# Patient Record
Sex: FEMALE | Race: WHITE | Employment: OTHER | URBAN - METROPOLITAN AREA
[De-identification: names, ages, dates, MRNs, and addresses within clinical notes are randomized per-mention and may not be internally consistent; named-entity substitution may affect disease eponyms.]

---

## 2017-09-27 PROBLEM — M20.11 HALLUX VALGUS (ACQUIRED), RIGHT FOOT: Status: ACTIVE | Noted: 2017-09-27

## 2018-08-22 ENCOUNTER — HOSPITAL ENCOUNTER (OUTPATIENT)
Dept: MAMMOGRAPHY | Age: 73
Discharge: HOME | End: 2018-08-24
Payer: COMMERCIAL

## 2018-08-22 ENCOUNTER — HOSPITAL ENCOUNTER (OUTPATIENT)
Dept: PULMONOLOGY | Age: 73
Discharge: HOME | End: 2018-08-22
Payer: COMMERCIAL

## 2018-08-22 DIAGNOSIS — M81.0 AGE-RELATED OSTEOPOROSIS WITHOUT CURRENT PATHOLOGICAL FRACTURE: ICD-10-CM

## 2018-08-22 PROCEDURE — 94729 DIFFUSING CAPACITY: CPT

## 2018-08-22 PROCEDURE — 94060 EVALUATION OF WHEEZING: CPT

## 2018-08-22 PROCEDURE — 94726 PLETHYSMOGRAPHY LUNG VOLUMES: CPT

## 2018-08-22 PROCEDURE — 77080 DXA BONE DENSITY AXIAL: CPT

## 2018-09-03 NOTE — PROCEDURES
[address]                                PULMONARY FUNCTION    PATIENT NAME: Tanvi Vyas                      :        1945  MED REC NO:   [id number]                            ROOM:  ACCOUNT NO:   [de-identified]                           ADMIT DATE: 2018  PROVIDER:     Juani Falcon MD    DATE OF PROCEDURE:  2018    ATTENDING:  Dr. Sonya Gomez. PULMONOLOGIST:  Dr. Aisha Zamudio In conclusion, these PFTs do not reveal evidence of an obstruction. MVV is  normal.  No evidence of restriction. No evidence of hyperinflation. The  airway resistance is stable. The specific airway conduct is mildly  increased with no change postbronchodilator. Diffusion studies are normal.  This test may not explain the clinical presentation.         Jessica Mckeon MD    D: 2018 14:54:56       T: 2018 15:36:37     [email or web address]  Job#: [id number]     Doc#: [id number]    CC:

## 2020-03-09 ENCOUNTER — HOSPITAL ENCOUNTER (OUTPATIENT)
Dept: MAMMOGRAPHY | Age: 75
Discharge: HOME | End: 2020-03-11
Payer: MEDICARE

## 2020-03-09 PROCEDURE — 77067 SCR MAMMO BI INCL CAD: CPT

## 2021-08-04 ENCOUNTER — HOSPITAL ENCOUNTER (OUTPATIENT)
Dept: MAMMOGRAPHY | Age: 76
Discharge: HOME | End: 2021-08-06
Payer: MEDICARE

## 2021-08-04 VITALS — HEIGHT: 61 IN | BODY MASS INDEX: 26.81 KG/M2 | WEIGHT: 142 LBS

## 2021-08-04 DIAGNOSIS — Z12.31 ENCOUNTER FOR SCREENING MAMMOGRAM FOR MALIGNANT NEOPLASM OF BREAST: ICD-10-CM

## 2021-08-04 PROCEDURE — 77067 SCR MAMMO BI INCL CAD: CPT

## 2021-09-07 ENCOUNTER — HOSPITAL ENCOUNTER (OUTPATIENT)
Dept: ULTRASOUND IMAGING | Age: 76
Discharge: HOME | End: 2021-09-07
Payer: MEDICARE

## 2021-09-07 ENCOUNTER — HOSPITAL ENCOUNTER (OUTPATIENT)
Dept: MAMMOGRAPHY | Age: 76
Discharge: HOME | End: 2021-09-09
Payer: MEDICARE

## 2021-09-07 DIAGNOSIS — R92.8 ABNORMAL MAMMOGRAM OF RIGHT BREAST: ICD-10-CM

## 2021-09-07 PROCEDURE — G0279 TOMOSYNTHESIS, MAMMO: HCPCS

## 2021-11-12 ENCOUNTER — TELEPHONE (OUTPATIENT)
Dept: BREAST CENTER | Age: 76
End: 2021-11-12

## 2021-11-12 DIAGNOSIS — R92.1 BREAST CALCIFICATION, RIGHT: Primary | ICD-10-CM

## 2021-11-12 NOTE — TELEPHONE ENCOUNTER
Patient is a new referral, right breast stereotactic biopsy recommended. Patient is agreeable. She states she is not on any blood thinners.

## 2021-12-08 ENCOUNTER — HOSPITAL ENCOUNTER (OUTPATIENT)
Dept: GENERAL RADIOLOGY | Age: 76
Discharge: HOME | End: 2021-12-10
Payer: MEDICARE

## 2021-12-08 DIAGNOSIS — R92.1 BREAST CALCIFICATION, RIGHT: ICD-10-CM

## 2021-12-08 PROCEDURE — 2500000003 HC RX 250 WO HCPCS

## 2021-12-08 PROCEDURE — 77065 DX MAMMO INCL CAD UNI: CPT

## 2021-12-08 PROCEDURE — 88305 TISSUE EXAM BY PATHOLOGIST: CPT

## 2021-12-08 PROCEDURE — 2720000010 MAM STEREO BREAST BX W LOC DEVICE 1ST LESION RIGHT

## 2021-12-08 PROCEDURE — 76098 X-RAY EXAM SURGICAL SPECIMEN: CPT

## 2021-12-08 NOTE — PROGRESS NOTES
Met with patient and her  prior to her breast biopsy. Instructed on stereotactic/tomosynthesis guided  breast biopsy procedure. Denies use of blood thinners or aspirin products within the past 5 days. Instructed that results will be available in approximately 3-5 business days. She is agreeable to discussion of breast biopsy results by phone. Instructed that her physician will also be notified of results. Provided with folder containing my contact information, monthly breast self exam card, and post biopsy discharge instructions. Instructed to call me if she has any questions or concerns about her biopsy. Verbalizes understanding.  Electronically signed by Marcelle Hilliard RN, BSN on 12/8/2021 at 2:09 PM

## 2021-12-14 ENCOUNTER — TELEPHONE (OUTPATIENT)
Dept: GENERAL RADIOLOGY | Age: 76
End: 2021-12-14

## 2021-12-21 ENCOUNTER — TELEPHONE (OUTPATIENT)
Dept: BREAST CENTER | Age: 76
End: 2021-12-21

## 2021-12-21 NOTE — TELEPHONE ENCOUNTER
Called patient to see if she would like a follow up appointment. Her breast biopsy is benign. Patient states she is ok with the results and is opting not to see us.  Referring provider will be notified

## 2022-03-18 DIAGNOSIS — M25.561 RIGHT KNEE PAIN, UNSPECIFIED CHRONICITY: Primary | ICD-10-CM

## 2022-03-22 ENCOUNTER — OFFICE VISIT (OUTPATIENT)
Dept: ORTHOPEDIC SURGERY | Age: 77
End: 2022-03-22
Payer: MEDICARE

## 2022-03-22 VITALS — TEMPERATURE: 98 F | BODY MASS INDEX: 26.81 KG/M2 | WEIGHT: 142 LBS | HEIGHT: 61 IN

## 2022-03-22 DIAGNOSIS — Z96.651 S/P TOTAL KNEE ARTHROPLASTY, RIGHT: Primary | ICD-10-CM

## 2022-03-22 PROCEDURE — 99203 OFFICE O/P NEW LOW 30 MIN: CPT | Performed by: ORTHOPAEDIC SURGERY

## 2022-03-22 NOTE — PROGRESS NOTES
Chief Complaint   Patient presents with    Knee Pain     Right knee pain. Previous replacement by Dr. Hailey Mcgill in 2011. Knee has just started to hurt with no injury known. Subjective:     Patient ID: Martha Arguello is a 76 y.o..  female    Knee Pain  Patient complains of right knee pain. This is evaluated as a personal injury. There was not a history of injury. She had replacement done 11 years ago  The pain began 3 MONTHS ago. The pain is located lateral. She describes  Her symptoms as aching. She has experienced popping, clicking, locking, and giving way in the affected knee. The patient has had pain with kneeling, squating, and climbing stairs. Symptoms improve with rest. The symptoms are worse with activity. The knee has not given out or felt unstable. The patient can bend and straighten the knee fully. The patient is active in none. Treatment to date has been tka, without significant relief. The patient is not working. The patients occupation is RETIRED.      Past Medical History:   Diagnosis Date    Breast cancer ([facility] [address] 2000    right/ DCIS    Cancer ([facility] [address]     right breast  had lumpectomy & radiation     GERD (gastroesophageal reflux disease)     History of therapeutic radiation     Hyperlipidemia     Hypertension     PONV (postoperative nausea and vomiting)     with anesthesia     Past Surgical History:   Procedure Laterality Date    BREAST BIOPSY Left 1991    benign    BREAST BIOPSY Left 1994    benign    BREAST BIOPSY Right 2000    DCIS    BREAST LUMPECTOMY Right 2000    BREAST SURGERY Right 2000    lumpectomy    CHOLECYSTECTOMY      DILATION AND CURETTAGE OF UTERUS      FOOT SURGERY Right 09/27/2017    HYSTERECTOMY  1998    JOINT REPLACEMENT Right     knee    RACHID STEROTACTIC LOC BREAST BIOPSY RIGHT Right 12/8/2021    RACHID STEROTACTIC LOC BREAST BIOPSY RIGHT 12/8/2021 [facility]       Current Outpatient Medications:     Omega-3 Fatty Acids (FISH OIL BURP-LESS PO), Take by mouth daily, Disp: , Rfl:     vitamin D (CHOLECALCIFEROL) 1000 UNIT TABS tablet, Take 1,000 Units by mouth daily, Disp: , Rfl:     simvastatin (ZOCOR) 20 MG tablet, Take 20 mg by mouth nightly, Disp: , Rfl:     ENALAPRIL MALEATE PO, Take 10 mg by mouth 2 times daily , Disp: , Rfl:   No Known Allergies  Social History     Socioeconomic History    Marital status:      Spouse name: Not on file    Number of children: Not on file    Years of education: Not on file    Highest education level: Not on file   Occupational History    Not on file   Tobacco Use    Smoking status: Never Smoker    Smokeless tobacco: Never Used   Substance and Sexual Activity    Alcohol use: No    Drug use: Not on file    Sexual activity: Not on file   Other Topics Concern    Not on file   Social History Narrative    Not on file     Social Determinants of Health     Financial Resource Strain:     Difficulty of Paying Living Expenses: Not on file   Food Insecurity:     Worried About Running Out of Food in the Last Year: Not on file    Olga of Food in the Last Year: Not on file   Transportation Needs:     Lack of Transportation (Medical): Not on file    Lack of Transportation (Non-Medical):  Not on file   Physical Activity:     Days of Exercise per Week: Not on file    Minutes of Exercise per Session: Not on file   Stress:     Feeling of Stress : Not on file   Social Connections:     Frequency of Communication with Friends and Family: Not on file    Frequency of Social Gatherings with Friends and Family: Not on file    Attends Congregational Services: Not on file    Active Member of Clubs or Organizations: Not on file    Attends Club or Organization Meetings: Not on file    Marital Status: Not on file   Intimate Partner Violence:     Fear of Current or Ex-Partner: Not on file    Emotionally Abused: Not on file    Physically Abused: Not on file    Sexually Abused: Not on file   Housing Stability:     Unable to exam of the lower extremities show ; quadriceps, hamstrings, foot dorsi and plantar flexors intact R.  5/5 and L. 5/5. Deep tendon reflexes are 2/4 at the knees and 2/4 at the ankles with strong extensor hallicus longus motor strength bilaterally. Sensory to both feet is intact to all sensory roots. Cardiovascular: The vascular exam is normal and is well perfused to distal extremities. Distal pulses DP/PT: R. 2+; L. 2+. There is cap refill noted less than two seconds in all digits. There is not edema of the bilateral lower extremities. There is not varicosities noted in the distal extremities. Lymph:  Upon palpation,  there is no lymphadenopathy noted in bilateral lower extremities. Musculoskeletal:  Gait: antalgic; examination of the nails and digits reveal no cyanosis or clubbing. Lumbar exam:  On visual inspection, there is not deformity of the spine. full range of motion, no tenderness, palpable spasm or pain on motion. Special tests: Straight Leg Raise negative, Gutierrez test negative. Hip exam:   Upon inspection, there is not deformity noted. Upon palpation there is not tenderness. ROM: is  full and symmetrical.   Strength: Hip Flexors 5/5; Hip Abductors 5/5; Hip Adduction 5/5. Knee exam:  Right knee exam shows;  range of motion of R. Knee is 0 to 120, and L. Knee is 0 to 120. The patient does have  pain on motion, effusion is mild, there is tenderness over the  lateral region, there are not any masses, there is not ligamentous instability, there is not  deformity noted. Knee exam: neither positive for moderate crepitations, some mild tenderness laxity is not noted with  stress.   There is not a popliteal cyst.    R. Knee:  Lachman's negative, Anterior Drawer negative, Posterior Drawer negative  Prateek's negative, Thallasy  negative,   PF grind test negative, Apprehension test negative, Patellar J sign  negative  L. Knee:  Lachman's negative, Anterior Drawer negative, Posterior Drawer negative  Prateek's negative, Thallasy  negative,   PF grind test negative, Apprehension test negative,  Patellar J sign  negative    Xray Exam:  XR IS DOWN TODAY   Radiographic findings reviewed with patient    Assessment:  Encounter Diagnoses   Name Primary?  S/P total knee arthroplasty, right Yes       Plan:  Natural history and expected course discussed. Questions answered. Educational materials distributed. Rest, ice, compression, and elevation (RICE) therapy. Reduction in offending activity. I had a lengthy discussion with the patient regarding their diagnosis. I explained treatment options including surgical vs non surgical treatment. I reviewed in detail the risks and benefits and outlined the procedure in detail with expected outcomes and possible complications. I also discussed non surgical treatment such as injections (CSI ), physical therapy, topical creams and NSAID's. They have elected for CONSERVATIVE management at this time.    Continue nsaids  fu in 4 weeks

## 2022-04-19 ENCOUNTER — OFFICE VISIT (OUTPATIENT)
Dept: ORTHOPEDIC SURGERY | Age: 77
End: 2022-04-19
Payer: MEDICARE

## 2022-04-19 VITALS — WEIGHT: 142 LBS | BODY MASS INDEX: 26.81 KG/M2 | HEIGHT: 61 IN | TEMPERATURE: 98 F

## 2022-04-19 DIAGNOSIS — Z96.651 S/P TOTAL KNEE ARTHROPLASTY, RIGHT: Primary | ICD-10-CM

## 2022-04-19 PROCEDURE — 99213 OFFICE O/P EST LOW 20 MIN: CPT | Performed by: ORTHOPAEDIC SURGERY

## 2022-04-19 NOTE — PROGRESS NOTES
Chief Complaint   Patient presents with    Knee Pain     Right TKA  F/U       Subjective:     Patient ID: Christi Underwood is a 76 y.o..  female    Knee Pain  Patient complains of right knee pain. This is evaluated as a personal injury. There was not a history of injury. She had tka by dr Evelyn Regalado in 2011. She had been doing well until recently.      Past Medical History:   Diagnosis Date    Breast cancer ([facility] [address] 2000    right/ DCIS    Cancer ([facility] [address]     right breast  had lumpectomy & radiation     GERD (gastroesophageal reflux disease)     History of therapeutic radiation     Hyperlipidemia     Hypertension     PONV (postoperative nausea and vomiting)     with anesthesia     Past Surgical History:   Procedure Laterality Date    BREAST BIOPSY Left 1991    benign    BREAST BIOPSY Left 1994    benign    BREAST BIOPSY Right 2000    DCIS    BREAST LUMPECTOMY Right 2000    BREAST SURGERY Right 2000    lumpectomy    CHOLECYSTECTOMY      DILATION AND CURETTAGE OF UTERUS      FOOT SURGERY Right 09/27/2017    HYSTERECTOMY  1998    JOINT REPLACEMENT Right     knee    RACHID STEROTACTIC LOC BREAST BIOPSY RIGHT Right 12/8/2021    RACHID STEROTACTIC LOC BREAST BIOPSY RIGHT 12/8/2021 [facility]       Current Outpatient Medications:     Omega-3 Fatty Acids (FISH OIL BURP-LESS PO), Take by mouth daily, Disp: , Rfl:     vitamin D (CHOLECALCIFEROL) 1000 UNIT TABS tablet, Take 1,000 Units by mouth daily, Disp: , Rfl:     simvastatin (ZOCOR) 20 MG tablet, Take 20 mg by mouth nightly, Disp: , Rfl:     ENALAPRIL MALEATE PO, Take 10 mg by mouth 2 times daily , Disp: , Rfl:   No Known Allergies  Social History     Socioeconomic History    Marital status:      Spouse name: Not on file    Number of children: Not on file    Years of education: Not on file    Highest education level: Not on file   Occupational History    Not on file   Tobacco Use    Smoking status: Never Smoker    Smokeless tobacco: Never Used Substance and Sexual Activity    Alcohol use: No    Drug use: Not on file    Sexual activity: Not on file   Other Topics Concern    Not on file   Social History Narrative    Not on file     Social Determinants of Health     Financial Resource Strain:     Difficulty of Paying Living Expenses: Not on file   Food Insecurity:     Worried About Running Out of Food in the Last Year: Not on file    Olga of Food in the Last Year: Not on file   Transportation Needs:     Lack of Transportation (Medical): Not on file    Lack of Transportation (Non-Medical): Not on file   Physical Activity:     Days of Exercise per Week: Not on file    Minutes of Exercise per Session: Not on file   Stress:     Feeling of Stress : Not on file   Social Connections:     Frequency of Communication with Friends and Family: Not on file    Frequency of Social Gatherings with Friends and Family: Not on file    Attends Congregation Services: Not on file    Active Member of [address] [place] [address] or Organizations: Not on file    Attends Club or Organization Meetings: Not on file    Marital Status: Not on file   Intimate Partner Violence:     Fear of Current or Ex-Partner: Not on file    Emotionally Abused: Not on file    Physically Abused: Not on file    Sexually Abused: Not on file   Housing Stability:     Unable to Pay for Housing in the Last Year: Not on file    Number of [place] in the Last Year: Not on file    Unstable Housing in the Last Year: Not on file     Family History   Problem Relation Age of Onset    Rectal Cancer Mother     Breast Cancer Maternal Aunt         postmenopausal         REVIEW OF SYSTEMS:     General/Constitution:  (-)weight loss, (-)fever, (-)chills, (-)weakness. Skin: (-) rash,(-) psoriasis,(-) eczema, (-)skin cancer.    Musculoskeletal: (-) fractures,  (-) dislocations,(-) collagen vascular disease, (-) fibromyalgia, (-) multiple sclerosis, (-) muscular dystrophy, (-) RSD,(-) joint pain (-)swelling, (-) joint pain,swelling. Neurologic: (-) epilepsy, (-)seizures,(-) brain tumor,(-) TIA, (-)stroke, (-)headaches, (-)Parkinson disease,(-) memory loss, (-) LOC. Cardiovascular: (-) Chest pain, (-) swelling in legs/feet, (-) SOB, (-) cramping in legs/feet with walking. Respiratory: (-) SOB, (-) Coughing, (-) night sweats. GI: (-) nausea, (-) vomiting, (-) diarrhea, (-) blood in stool, (-) gastric ulcer. Psychiatric: (-) Depression, (-) Anxiety, (-) bipolar disease, (-) Alzheimer's Disease  Allergic/Immunologic: (-) allergies latex, (-) allergies metal, (-) skin sensitivity. Hematlogic: (-) anemia, (-) blood transfusion, (-) DVT/PE, (-) Clotting disorders    Subjective:    Constitution:  Temp 98 °F (36.7 °C)   Ht 5' 1\" (1.549 m)   Wt 142 lb (64.4 kg)   BMI 26.83 kg/m²     Psycihatric:  The patient is alert and oriented x 3, appears to be stated age and in no distress. Respiratory:  Respiratory effort is not labored. Patient is not gasping. Palpation of the chest reveals no tactile fremitus. Skin:  Upon inspection: the skin appears warm, dry and intact. There is  a previous scar over the affected area. There is any cellulitis, lymphedema or cutaneous lesions noted in the lower extremities. Upon palpation there is no induration noted. Neurologic:  Gait: antalgic; Motor exam of the lower extremities show ; quadriceps, hamstrings, foot dorsi and plantar flexors intact R.  5/5 and L. 5/5. Deep tendon reflexes are 2/4 at the knees and 2/4 at the ankles with strong extensor hallicus longus motor strength bilaterally. Sensory to both feet is intact to all sensory roots. Cardiovascular: The vascular exam is normal and is well perfused to distal extremities. Distal pulses DP/PT: R. 2+; L. 2+. There is cap refill noted less than two seconds in all digits. There is not edema of the bilateral lower extremities. There is not varicosities noted in the distal extremities.       Lymph:  Upon palpation, there is no lymphadenopathy noted in bilateral lower extremities. Musculoskeletal:  Gait: antalgic; examination of the nails and digits reveal no cyanosis or clubbing. Lumbar exam:  On visual inspection, there is not deformity of the spine. full range of motion, no tenderness, palpable spasm or pain on motion. Special tests: Straight Leg Raise negative, Gutierrez test negative. Hip exam:   Upon inspection, there is not deformity noted. Upon palpation there is not tenderness. ROM: is  full and symmetrical.   Strength: Hip Flexors 5/5; Hip Abductors 5/5; Hip Adduction 5/5. Knee exam:  Right knee exam shows;  range of motion of R. Knee is 0 to 110, and L. Knee is 0 to 120. The patient does have  pain on motion, effusion is none, there is tenderness over the  medial, lateral, anterior region, there are not any masses, there is not ligamentous instability, there is not  deformity noted. Knee exam: neither positive for moderate crepitations, some mild tenderness laxity is not noted with  stress. There is not a popliteal cyst.    R. Knee:  Lachman's negative, Anterior Drawer negative, Posterior Drawer negative  Prateek's negative, Thallasy  negative,   PF grind test negative, Apprehension test negative, Patellar J sign  negative  L. Knee:  Lachman's negative, Anterior Drawer negative, Posterior Drawer negative  Prateek's negative, Thallasy  negative,   PF grind test negative, Apprehension test negative,  Patellar J sign  negative    Xray Exam:  S/p knee arthroplasty with no signs of loosening  Radiographic findings reviewed with patient    Assessment:  Encounter Diagnoses   Name Primary?  S/P total knee arthroplasty, right Yes       Plan:  Natural history and expected course discussed. Questions answered. Educational materials distributed. Rest, ice, compression, and elevation (RICE) therapy. Reduction in offending activity. Patellar compression sleeve.   Discussed no signs of loosening on xr  Pt  hep  voltaren gel  Fu prn

## 2022-04-25 ENCOUNTER — EVALUATION (OUTPATIENT)
Dept: PHYSICAL THERAPY | Age: 77
End: 2022-04-25
Payer: MEDICARE

## 2022-04-25 DIAGNOSIS — Z96.651 S/P TOTAL KNEE ARTHROPLASTY, RIGHT: Primary | ICD-10-CM

## 2022-04-25 PROCEDURE — 97163 PT EVAL HIGH COMPLEX 45 MIN: CPT | Performed by: PHYSICAL THERAPIST

## 2022-04-25 NOTE — PROGRESS NOTES
[facility] OUTPATIENT REHABILITATION  PHYSICAL THERAPY INITIAL EVALUATION         Date:  2022   Patient: Katherine Salazar  : 1945  MRN: [id number]  Referring Provider: DO Aline Ocampo [place]     Medical Diagnosis:      Diagnosis Orders   1. S/P total knee arthroplasty, right         Physician Order: Eval and Treat      SUBJECTIVE:     Onset date: 2022    Onset: Insidious      History / Mechanism of Injury: Reports onset of lateral knee pain that began insidiously about 2 months ago. She denies any injury or change in activity. Prior to this she was fine. She has a history R TKA  [facility]). Imaging notes good placement of prosthesis with no signs of loosening. Interventions for current problem: Voltaren gel     Chief complaint: pain, weakness, difficulty walking, difficulty with stairs, limited ability to complete IADLs (cooking, cleaning, transportation , laundry), limited ability to complete home/outdoor chores/tasks, limited tolerance to weightbearing tasks and weightbearing duration, use of assistive device     Pain: intermittent  Current: 4/10     Best: 0/10     Worst:4/10     Location[de-identified] Knee: lateral    Aggravated by: unable to determine    Relieved by: Tylenol    Imaging results: XR KNEE RIGHT (MIN 4 VIEWS)    Result Date: 2022  EXAMINATION: FOUR XRAY VIEWS OF THE RIGHT KNEE 2022 9:52 am COMPARISON: 2007 HISTORY: ORDERING SYSTEM PROVIDED HISTORY: Right knee pain, unspecified chronicity FINDINGS: Anatomic alignment of right knee arthroplasty with small joint effusion and no abnormal osseous findings. Right TKA with small joint effusion.        Past Medical History  Past Medical History:   Diagnosis Date    Breast cancer ([facility] [address]     right/ DCIS    Cancer ([facility] [address]     right breast  had lumpectomy & radiation     GERD (gastroesophageal reflux disease)     History of therapeutic radiation     Hyperlipidemia     Hypertension     PONV (postoperative nausea and vomiting)     with anesthesia     Past Surgical History:   Procedure Laterality Date    BREAST BIOPSY Left 1991    benign    BREAST BIOPSY Left 1994    benign    BREAST BIOPSY Right 2000    DCIS    BREAST LUMPECTOMY Right 2000    BREAST SURGERY Right 2000    lumpectomy    CHOLECYSTECTOMY      DILATION AND CURETTAGE OF UTERUS      FOOT SURGERY Right 09/27/2017    HYSTERECTOMY  1998    JOINT REPLACEMENT Right     knee    RACHID STEROTACTIC LOC BREAST BIOPSY RIGHT Right 12/8/2021    RACHID STEROTACTIC LOC BREAST BIOPSY RIGHT 12/8/2021 [facility]       Medications:   Current Outpatient Medications   Medication Sig Dispense Refill    diclofenac sodium (VOLTAREN) 1 % GEL Apply 4 g topically 4 times daily 480 g 3    Omega-3 Fatty Acids (FISH OIL BURP-LESS PO) Take by mouth daily      vitamin D (CHOLECALCIFEROL) 1000 UNIT TABS tablet Take 1,000 Units by mouth daily      simvastatin (ZOCOR) 20 MG tablet Take 20 mg by mouth nightly      ENALAPRIL MALEATE PO Take 10 mg by mouth 2 times daily        No current facility-administered medications for this visit. Exercise regimen: [employer] daily. Reports it does not aggravate her knee. Hobbies: makes homemade cards -- no worse afterwards    Patient Goals: pain relief    Precautions / Contraindications: none    OBJECTIVE:     Estimated body mass index is 26.83 kg/m² as calculated from the following:    Height as of 4/19/22: 5' 1\" (1.549 m). Weight as of 4/19/22: 142 lb (64.4 kg).      Observations: well nourished female, normal affect      Inspection: normal orthopedic exam    Edema: none     Gait: antalgic gait, limp R LE    Joint/Motion:    Knee:  Right:   AROM: 110° Flexion,  -8° Extension  PROM: 115° Flexion,  -8° Extension - pain at both ends of motion   Left:   AROM: 130° Flexion,  -8° Extension      Strength:    Knee:   Right: Flexion 3/5,  Extension 3/5  Left: Flexion 5/5, 00251 PT Evaluation: High Complexity   [id number] PT Re-Evaluation   02937 Therapeutic Exercise   [phone] Neuromuscular Re-Education   83282 Therapeutic Activities     Suggested Professional Referral: [x] No  [] Yes:     Patient Education:  [x] Plans / Goals, Risks / Benefits discussed  [x] Home exercise program  Method of Education: [x] Verbal  [x] Demo  [x] Written  Comprehension of Education:  [x] Verbalizes understanding. [x] Demonstrates understanding. [] Needs Review. [] Demonstrates / verbalizes understanding of HEP / Heather Sleeper previously given. Frequency: 1-2 days per week for 4-6 weeks    Patient understands diagnosis/prognosis and consents to treatment, plan and goals: [x] Yes    [] No     Thank you for the opportunity to work with your patient. If you have questions or comments, please contact me at [phone]; fax: [phone]. Electronically signed by: Jose Alejandro Dozier, PT         Please sign Physician's Certification and return to: [address] [place] [address] PHYSICAL THERAPY  [facility] [facility] [address]  Dept: [phone]  Dept Fax: [phone]  Loc: [phone] Certification / Comments     Frequency/Duration 1-2 days per week for 4-6 weeks. Certification period from 4/25/2022  to 7/25/2022. I have reviewed the Plan of Care established for skilled therapy services and certify that the services are required and that they will be provided while the patient is under my care.     Physician's Comments/Revisions:               Physician's Printed Name:                                           [de-identified] Signature:                                                               Date:

## 2022-04-25 NOTE — PROGRESS NOTES
Physical Therapy Daily Treatment Note    Date: 2022  Patient Name: Gricel Mcmahon  : 1945   MRN: [id number]  DOInjury: 2022  DOSx: --  Referring Provider: Robyn Corey DO   [address] [facility] Diagnosis:      Diagnosis Orders   1. S/P total knee arthroplasty, right         Kristofer has joint pain of R knee with loss of motion, weakness, and decreased function. Treatment will focus on ROM, strengthening, and developing joint loading tolerance.       Outcome Measure:   Lower Extremity Functional Scale (LEFS) 33% impairment    Access Code: [email or web address]  URL: [email or web address]  Date: 2022  Prepared by: Tyree Dela Cruz    Exercises  Supine Quad Set - 2 x daily - 3 sets - 10-15 reps  Supine Heel Slide - 2 x daily - 3 sets - 10 reps  Supine Straight Leg Raises - 2 x daily - 3 sets - 10 reps      X = TO BE PERFORMED NEXT VISIT  > = PROGRESS TO THIS    S: See eval  O:    Time  3259-9228       Visit  /? Repeat outcome measure at mid point and end. Pain    Pain 4/10     ROM  -8 ext, 110 flex  Extension has firm end feel and not likely to change. Flexion has soft end feel with pain; likely to improve. Modalities          MO   Manual      Stretching knee flexion   MT   Stretching       Knee flex stretch-seated Monitor. Add as needed. TE   Exercise       Nustep  X  TE   Quad sets 5 sec hold 3 x 10 reps  TE   Heel slides 3 x 10   TE   SLR 3 x 10  TE   LAQ   TE   Marching   TA   Squat    TA   Step-ups - FWD    TA   Step-ups - LAT   TA   Step-ups - BWD    TA   Step up and over reciprocally    TA   [] TG  [] Leg Press 2-leg   TE   [] TG  [] Leg Press 1-leg   TE   CR    TE   Knee Extension Machine   TE   Marching gait   NR   Side stepping   NR               A: Tolerated well. Discussed anatomy, physiology, body mechanics, principles of loading, and progressive loading/activity.   Reviewed home exercise program extensively along with instructions for ice and elevation; written copy provided.     P: Continue with rehab plan  Aurelia Celaya PT    Treatment Charges: Mins Units   Initial Evaluation 40 1   Re-Evaluation     Ther Exercise         TE     Manual Therapy     MT     Ther Activities        TA     Gait Training          GT     Neuro Re-education NR     Modalities     Non-Billable Service Time     Other     Total Time/Units 40 1

## 2022-05-02 ENCOUNTER — TREATMENT (OUTPATIENT)
Dept: PHYSICAL THERAPY | Age: 77
End: 2022-05-02
Payer: MEDICARE

## 2022-05-02 DIAGNOSIS — Z96.651 S/P TOTAL KNEE ARTHROPLASTY, RIGHT: Primary | ICD-10-CM

## 2022-05-02 PROCEDURE — 97110 THERAPEUTIC EXERCISES: CPT

## 2022-05-02 NOTE — PROGRESS NOTES
Physical Therapy Daily Treatment Note    Date: 2022  Patient Name: Keshav Evangelista  : 1945   MRN: [id number]  DOInjury: 2022  DOSx: --  Referring Provider: Manish Willingham DO   [address]  [facility]     Medical Diagnosis:      Diagnosis Orders   1. S/P total knee arthroplasty, right         Kristofer has joint pain of R knee with loss of motion, weakness, and decreased function. Treatment will focus on ROM, strengthening, and developing joint loading tolerance.       Outcome Measure:   Lower Extremity Functional Scale (LEFS) 33% impairment    Access Code: [email or web address]  URL: [email or web address]  Date: 2022  Prepared by: Deanne Varela    Exercises  Supine Quad Set - 2 x daily - 3 sets - 10-15 reps  Supine Heel Slide - 2 x daily - 3 sets - 10 reps  Supine Straight Leg Raises - 2 x daily - 3 sets - 10 reps      X = TO BE PERFORMED NEXT VISIT  > = PROGRESS TO THIS    S: Pt reports 2/10 pain at most. States has been doing exercises at home which have really helped with pain. O:    Time  4783-0130      Visit  2/auth? Repeat outcome measure at mid point and end. Pain    Pain 4/10     ROM  -8 ext, 110 flex  Extension has firm end feel and not likely to change. Flexion has soft end feel with pain; likely to improve. Modalities          MO   Manual      Stretching knee flexion   MT   Stretching       Knee flex stretch-seated Monitor. Add as needed. TE   Exercise       Nustep  L5 x 5 min  TE   Quad sets 5 sec hold 3 x 10 reps W/towel under knee TE   Heel slides 3 x 10   TE   SLR 3 x 10  TE   LAQ 3 x 10  TE   Marching  x 20 ea LE  TA   Alt hip Abd X 20 ea LE     Squat    TA   Step-ups - FWD    TA   Step-ups - LAT   TA   Step-ups - BWD    TA   Step up and over reciprocally    TA   [x] TG  [] Leg Press 2-leg L15 2 x 20  TE   [] TG  [] Leg Press 1-leg   TE   CR    TE   Knee Extension Machine   TE   Marching gait   NR   Side stepping   NR               A: Tolerated well. Minimal soreness during exercises which went away at rest.  P: Continue with rehab plan  Michelle Schmidt PTA    Treatment Charges: Mins Units   Initial Evaluation     Re-Evaluation     Ther Exercise         TE 37 3   Manual Therapy     MT     Ther Activities        TA     Gait Training          GT     Neuro Re-education NR     Modalities     Non-Billable Service Time     Other     Total Time/Units 37 3

## 2022-05-09 ENCOUNTER — TREATMENT (OUTPATIENT)
Dept: PHYSICAL THERAPY | Age: 77
End: 2022-05-09
Payer: MEDICARE

## 2022-05-09 DIAGNOSIS — Z96.651 S/P TOTAL KNEE ARTHROPLASTY, RIGHT: Primary | ICD-10-CM

## 2022-05-09 PROCEDURE — 97110 THERAPEUTIC EXERCISES: CPT

## 2022-05-09 NOTE — PROGRESS NOTES
Physical Therapy Daily Treatment Note    Date: 2022  Patient Name: Abrahan Duncan  : 1945   MRN: [id number]  DOInjury: 2022  DOSx: --  Referring Provider: Tracie Tyler DO   [address]  [facility]     Medical Diagnosis:      Diagnosis Orders   1. S/P total knee arthroplasty, right         Kristofer has joint pain of R knee with loss of motion, weakness, and decreased function. Treatment will focus on ROM, strengthening, and developing joint loading tolerance.       Access Code: [email or web address]  URL: [email or web address]  Date: 2022  Prepared by: Nava Huang    Exercises  Supine Quad Set - 2 x daily - 3 sets - 10-15 reps  Supine Heel Slide - 2 x daily - 3 sets - 10 reps  Supine Straight Leg Raises - 2 x daily - 3 sets - 10 reps  Seated Long Arc Quad - 2 x daily - 3 sets - 10 reps  Standing march with counter support option - 3 x weekly - 1 sets - 20 reps  Alternating Hip Abduction with counter support - 3 x weekly - 1 sets - 20 reps      X = TO BE PERFORMED NEXT VISIT  > = PROGRESS TO THIS    S: Pt reports 2/10 pain at most. States has been doing exercises at home which have really helped with pain. O:    Time  3862-3902      Visit    Ref # [id number]  Approved 8 visits  2022 through 2022 Repeat outcome measure at mid point and end. Pain    Pain 2/10     ROM  -8 ext, 110 flex  Extension has firm end feel and not likely to change. Flexion has soft end feel with pain; likely to improve. Modalities          MO   Manual      Stretching knee flexion   MT   Stretching       Knee flex stretch-seated Monitor. Add as needed.    TE   Exercise       Nustep  L5 x 5 min  TE   Quad sets 5 sec hold 3 x 10 reps W/towel under knee TE   Heel slides 2 x 20   TE   SLR 2 x 15  TE   LAQ 3 x 10  TE   Marching  x 20 ea LE  TA   Alt hip Abd X 20 ea LE     Squat    TA   Step-ups - FWD    TA   Step-ups - LAT   TA   Step-ups - BWD    TA   Step up and over reciprocally    TA   [x] TG [] Leg Press 2-leg L15 2 x 20  TE   [] TG  [] Leg Press 1-leg   TE   CR    TE   Knee Extension Machine   TE   Marching gait   NR   Side stepping   NR               A: Tolerated well. Reviewed HEP and given written copy. P: Pt is happy with her progress and is proficient with her HEP. She wants to continue on her own.   Mason Estrella, PTA    Treatment Charges: Mins Units   Initial Evaluation     Re-Evaluation     Ther Exercise         TE 37 3   Manual Therapy     MT     Ther Activities        TA     Gait Training          GT     Neuro Re-education NR     Modalities     Non-Billable Service Time     Other     Total Time/Units 37 3

## 2023-01-13 DIAGNOSIS — M25.562 LEFT KNEE PAIN, UNSPECIFIED CHRONICITY: Primary | ICD-10-CM

## 2023-01-17 ENCOUNTER — OFFICE VISIT (OUTPATIENT)
Dept: ORTHOPEDIC SURGERY | Age: 78
End: 2023-01-17
Payer: MEDICARE

## 2023-01-17 VITALS — HEIGHT: 61 IN | BODY MASS INDEX: 27.38 KG/M2 | TEMPERATURE: 98 F | WEIGHT: 145 LBS

## 2023-01-17 DIAGNOSIS — M17.12 PRIMARY OSTEOARTHRITIS OF LEFT KNEE: Primary | ICD-10-CM

## 2023-01-17 PROCEDURE — 1123F ACP DISCUSS/DSCN MKR DOCD: CPT | Performed by: ORTHOPAEDIC SURGERY

## 2023-01-17 PROCEDURE — 99214 OFFICE O/P EST MOD 30 MIN: CPT | Performed by: ORTHOPAEDIC SURGERY

## 2023-01-17 NOTE — PROGRESS NOTES
Chief Complaint   Patient presents with    Knee Pain     Left Knee x 1 month, no known injury, no previous left knee surgery. Was given Meloxicam from PCP with some relief. Subjective:     Patient ID: Iqra Alfaro is a 76 y.o..  female    Knee Pain  Patient complains of left knee pain. This is evaluated as a personal injury. There was not a history of injury. The pain began several years ago. The pain is located medial. She describes  Her symptoms as aching. She has experienced popping, clicking, locking, and giving way in the affected knee. The patient has had pain with kneeling, squating, and climbing stairs. Symptoms improve with rest. The symptoms are worse with activity. The knee has not given out or felt unstable. The patient cannot bend and straighten the knee fully. The patient is active in none. Treatment to date has been ice, heat, Tylenol, without significant relief. The patient is not working. The patients occupation is retired.      Past Medical History:   Diagnosis Date    Breast cancer ([facility] [address] 2000    right/ DCIS    Cancer ([facility] [address]     right breast  had lumpectomy & radiation     GERD (gastroesophageal reflux disease)     History of therapeutic radiation     Hyperlipidemia     Hypertension     PONV (postoperative nausea and vomiting)     with anesthesia     Past Surgical History:   Procedure Laterality Date    BREAST BIOPSY Left 1991    benign    BREAST BIOPSY Left 1994    benign    BREAST BIOPSY Right 2000    DCIS    BREAST LUMPECTOMY Right 2000    BREAST SURGERY Right 2000    lumpectomy    CHOLECYSTECTOMY      DILATION AND CURETTAGE OF UTERUS      FOOT SURGERY Right 09/27/2017    HYSTERECTOMY (CERVIX STATUS UNKNOWN)  1998    JOINT REPLACEMENT Right     knee    RACHID STEROTACTIC LOC BREAST BIOPSY RIGHT Right 12/8/2021    RACHID STEROTACTIC LOC BREAST BIOPSY RIGHT 12/8/2021 [facility]       Current Outpatient Medications:     Omega-3 Fatty Acids (FISH OIL BURP-LESS PO), Take by mouth daily, Disp: , Rfl:     vitamin D (CHOLECALCIFEROL) 1000 UNIT TABS tablet, Take 1,000 Units by mouth daily, Disp: , Rfl:     simvastatin (ZOCOR) 20 MG tablet, Take 20 mg by mouth nightly, Disp: , Rfl:     ENALAPRIL MALEATE PO, Take 10 mg by mouth 2 times daily , Disp: , Rfl:     diclofenac sodium (VOLTAREN) 1 % GEL, Apply 4 g topically 4 times daily, Disp: 480 g, Rfl: 3  No Known Allergies  Social History     Socioeconomic History    Marital status:      Spouse name: Not on file    Number of children: Not on file    Years of education: Not on file    Highest education level: Not on file   Occupational History    Not on file   Tobacco Use    Smoking status: Never    Smokeless tobacco: Never   Substance and Sexual Activity    Alcohol use: No    Drug use: Not on file    Sexual activity: Not on file   Other Topics Concern    Not on file   Social History Narrative    Not on file     Social Determinants of Health     Financial Resource Strain: Not on file   Food Insecurity: Not on file   Transportation Needs: Not on file   Physical Activity: Not on file   Stress: Not on file   Social Connections: Not on file   Intimate Partner Violence: Not on file   Housing Stability: Not on file     Family History   Problem Relation Age of Onset    Rectal Cancer Mother     Breast Cancer Maternal Aunt         postmenopausal         REVIEW OF SYSTEMS:     General/Constitution:  (-)weight loss, (-)fever, (-)chills, (-)weakness. Skin: (-) rash,(-) psoriasis,(-) eczema, (-)skin cancer. Musculoskeletal: (-) fractures,  (-) dislocations,(-) collagen vascular disease, (-) fibromyalgia, (-) multiple sclerosis, (-) muscular dystrophy, (-) RSD,(-) joint pain (-)swelling, (-) joint pain,swelling. Neurologic: (-) epilepsy, (-)seizures,(-) brain tumor,(-) TIA, (-)stroke, (-)headaches, (-)Parkinson disease,(-) memory loss, (-) LOC.   Cardiovascular: (-) Chest pain, (-) swelling in legs/feet, (-) SOB, (-) cramping in legs/feet with walking. Respiratory: (-) SOB, (-) Coughing, (-) night sweats. GI: (-) nausea, (-) vomiting, (-) diarrhea, (-) blood in stool, (-) gastric ulcer. Psychiatric: (-) Depression, (-) Anxiety, (-) bipolar disease, (-) Alzheimer's Disease  Allergic/Immunologic: (-) allergies latex, (-) allergies metal, (-) skin sensitivity. Hematlogic: (-) anemia, (-) blood transfusion, (-) DVT/PE, (-) Clotting disorders    Subjective:  _Temp 98 °F (36.7 °C)   Ht 5' 1\" (1.549 m)   Wt 145 lb (65.8 kg)   BMI 27.40 kg/m²  Vital signs are stable. In general, patient is awake, alert and oriented X3, in no apparent distress. Examination of HENT reveals normocephalic, atraumatic. PERRLA/EOMI sclera are white. Conjunctivae are clear. TM's are intact. Pharynx is pink and moist.  Uvula and tongue are midline. Heart: Positive S1 and positive S2 with regular rate and rhythm. Lungs: Clear to auscultation bilaterally without rales, rhonchi or wheezes. Abdomen: soft, nontender. Positive bowel sounds. No organomegaly. No guarding or rigidity. Constitution:  Temp 98 °F (36.7 °C)   Ht 5' 1\" (1.549 m)   Wt 145 lb (65.8 kg)   BMI 27.40 kg/m²     Psycihatric:  The patient is alert and oriented x 3, appears to be stated age and in no distress. Respiratory:  Respiratory effort is not labored. Patient is not gasping. Palpation of the chest reveals no tactile fremitus. Skin:  Upon inspection: the skin appears warm, dry and intact. There is  a previous scar over the affected area. There is any cellulitis, lymphedema or cutaneous lesions noted in the lower extremities. Upon palpation there is no induration noted. Neurologic:  Gait: antalgic; Motor exam of the lower extremities show ; quadriceps, hamstrings, foot dorsi and plantar flexors intact R.  5/5 and L. 5/5. Deep tendon reflexes are 2/4 at the knees and 2/4 at the ankles with strong extensor hallicus longus motor strength bilaterally.  Sensory to both feet is intact to all sensory roots. Cardiovascular: The vascular exam is normal and is well perfused to distal extremities. Distal pulses DP/PT: R. 2+; L. 2+. There is cap refill noted less than two seconds in all digits. There is not edema of the bilateral lower extremities. There is not varicosities noted in the distal extremities. Lymph:  Upon palpation,  there is no lymphadenopathy noted in bilateral lower extremities. Musculoskeletal:  Gait: antalgic; examination of the nails and digits reveal no cyanosis or clubbing. Lumbar exam:  On visual inspection, there is not deformity of the spine. full range of motion, no tenderness, palpable spasm or pain on motion. Special tests: Straight Leg Raise negative, Gutierrez test negative. Hip exam:   Upon inspection, there is not deformity noted. Upon palpation there is not tenderness. ROM: is  full and symmetrical.   Strength: Hip Flexors 5/5; Hip Abductors 5/5; Hip Adduction 5/5. Knee exam:  Left knee exam shows;  range of motion of R. Knee is 0 to 120, and L. Knee is 5 to 120. The patient does have  pain on motion, effusion is mild, there is tenderness over the  medial region, there are not any masses, there is not ligamentous instability, there is  deformity noted. Knee exam: left positive for moderate crepitations, some mild tenderness laxity is not noted with  stress. There is not a popliteal cyst.    R. Knee:  Lachman's negative, Anterior Drawer negative, Posterior Drawer negative  Prateek's negative, Thallasy  negative,   PF grind test negative, Apprehension test negative, Patellar J sign  negative  L. Knee:  Lachman's negative, Anterior Drawer negative, Posterior Drawer negative  Prateek's positive, Thallasy  positive,   PF grind test positive, Apprehension test negative,  Patellar J sign  negative    Xray Exam:  Severe medial joint narrowing  Radiographic findings reviewed with patient    Assessment:  Encounter Diagnoses   Name Primary? Primary osteoarthritis of left knee Yes       Plan:  Natural history and expected course discussed. Questions answered. Educational materials distributed. Rest, ice, compression, and elevation (RICE) therapy. Reduction in offending activity. I had a lengthy discussion with the patient regarding their diagnosis. I explained treatment options including surgical vs non surgical treatment. I reviewed in detail the risks and benefits and outlined the procedure in detail with expected outcomes and possible complications. I also discussed non surgical treatment such as injections (CSI and visco supplementation), physical therapy, topical creams and NSAID's. They have elected for surgical management at this time. We discussed various treatment options both surgical and non-surgical.   The patient is unable to ambulate more than 100 feet and is unable to perform the average daily activities including:  Light housework, ADLs, donning clothes, toileting and exercise. Patient has failed previous conservative measures including cortisone injections, NSAIDs, PT, HEP and pain medication and is currently a fall risk to the disability and decreased functioning. The patient wishes to have the total knee arthroplasty. The risks and benefits of a total knee replacement were discussed with the patient. The risks include but are not limited to: infection, injury to blood vessels and nerves, non relief of symptoms, arthrofibrosis of knee, aseptic loosening of prosthesis, intraoperative fracture, blood loss, PE/DVT, MI, dislocation of hip and knee, need for further operative intervention and death. The patient is aware of the risks and wished to proceed with a Left total knee replacement 2/20/23. We will obtain medical clearence from the PCP. At least 30 minutes was spent discussing the diagnosis and treatment options with the patient with at least 50% of the time was spent with decision making and counseling the patient.   The patient was counseled at length about the risks of gia Covid-19 during their perioperative period and any recovery window from their procedure. The patient was made aware that gia Covid-19  may worsen their prognosis for recovering from their procedure  and lend to a higher morbidity and/or mortality risk. All material risks, benefits, and reasonable alternatives including postponing the procedure were discussed. The patient does wish to proceed with the procedure at this time.

## 2023-01-27 DIAGNOSIS — M17.12 PRIMARY OSTEOARTHRITIS OF LEFT KNEE: Primary | ICD-10-CM

## 2023-02-13 ENCOUNTER — TELEPHONE (OUTPATIENT)
Dept: ORTHOPEDIC SURGERY | Age: 78
End: 2023-02-13

## 2023-02-13 NOTE — TELEPHONE ENCOUNTER
Prior Authorization Form:      DEMOGRAPHICS:                     Patient Name:  Michael Morton  Patient :  1945            Insurance:  Payor: Bluford Lesches / Plan: Naomi Briseno ESSENTIAL/PLUS / Product Type: *No Product type* /   Insurance ID Number:    Payer/Plan Subscr  Sex Relation Sub. Ins. ID Effective Group Num   1.  [address] [facility] 1945 Female Self [id number]                                    [id number]         DIAGNOSIS & PROCEDURE:                       Procedure/Operation: LEFT KNEE TOTAL KNEE ARTHROPLASTY           CPT Code: 62382    Diagnosis:  LEFT KNEE DJD    ICD10 Code: M17.12    [facility]:  [facility]    Surgeon:  KALEIGH CEDILLO    SCHEDULING INFORMATION:                          Date: 23    Time: TO FOLLOW              Anesthesia:  Spinal                                                       Status:  Outpatient        Special Comments:  JUSTIN       Electronically signed by Gera Zuniga ATC on 2023 at 11:59 AM

## 2023-02-16 ENCOUNTER — ANESTHESIA EVENT (OUTPATIENT)
Dept: OPERATING ROOM | Age: 78
End: 2023-02-16
Payer: MEDICARE

## 2023-02-16 ENCOUNTER — HOSPITAL ENCOUNTER (OUTPATIENT)
Dept: GENERAL RADIOLOGY | Age: 78
Discharge: HOME | End: 2023-02-18
Payer: MEDICARE

## 2023-02-16 ENCOUNTER — HOSPITAL ENCOUNTER (OUTPATIENT)
Dept: PREADMISSION TESTING | Age: 78
Discharge: HOME | End: 2023-02-16
Payer: MEDICARE

## 2023-02-16 VITALS
HEART RATE: 86 BPM | BODY MASS INDEX: 26.24 KG/M2 | DIASTOLIC BLOOD PRESSURE: 82 MMHG | OXYGEN SATURATION: 99 % | HEIGHT: 61 IN | SYSTOLIC BLOOD PRESSURE: 133 MMHG | RESPIRATION RATE: 16 BRPM | TEMPERATURE: 97.8 F | WEIGHT: 139 LBS

## 2023-02-16 DIAGNOSIS — M17.12 PRIMARY OSTEOARTHRITIS OF LEFT KNEE: ICD-10-CM

## 2023-02-16 DIAGNOSIS — Z01.818 PRE-OP TESTING: Primary | ICD-10-CM

## 2023-02-16 LAB
ALBUMIN SERPL-MCNC: 4.6 G/DL (ref 3.5–5.2)
ALP BLD-CCNC: 82 U/L (ref 35–104)
ALT SERPL-CCNC: 13 U/L (ref 0–32)
ANION GAP SERPL CALCULATED.3IONS-SCNC: 7 MMOL/L (ref 7–16)
APTT: 30 SEC (ref 24.5–35.1)
AST SERPL-CCNC: 19 U/L (ref 0–31)
BACTERIA: ABNORMAL /HPF
BASOPHILS ABSOLUTE: 0.04 E9/L (ref 0–0.2)
BASOPHILS RELATIVE PERCENT: 0.5 % (ref 0–2)
BILIRUB SERPL-MCNC: 0.7 MG/DL (ref 0–1.2)
BILIRUBIN URINE: NEGATIVE
BLOOD, URINE: ABNORMAL
BUN BLDV-MCNC: 19 MG/DL (ref 6–23)
CALCIUM SERPL-MCNC: 9.8 MG/DL (ref 8.6–10.2)
CHLORIDE BLD-SCNC: 103 MMOL/L (ref 98–107)
CLARITY: CLEAR
CO2: 29 MMOL/L (ref 22–29)
COLOR: YELLOW
CREAT SERPL-MCNC: 0.7 MG/DL (ref 0.5–1)
EOSINOPHILS ABSOLUTE: 0.11 E9/L (ref 0.05–0.5)
EOSINOPHILS RELATIVE PERCENT: 1.3 % (ref 0–6)
GFR SERPL CREATININE-BSD FRML MDRD: >60 ML/MIN/1.73
GLUCOSE BLD-MCNC: 92 MG/DL (ref 74–99)
GLUCOSE URINE: 100 MG/DL
HBA1C MFR BLD: 5.4 % (ref 4–5.6)
HCT VFR BLD CALC: 44 % (ref 34–48)
HEMOGLOBIN: 15.3 G/DL (ref 11.5–15.5)
IMMATURE GRANULOCYTES #: 0.02 E9/L
IMMATURE GRANULOCYTES %: 0.2 % (ref 0–5)
INR BLD: 1
KETONES, URINE: NEGATIVE MG/DL
LEUKOCYTE ESTERASE, URINE: NEGATIVE
LYMPHOCYTES ABSOLUTE: 3.68 E9/L (ref 1.5–4)
LYMPHOCYTES RELATIVE PERCENT: 43.3 % (ref 20–42)
MCH RBC QN AUTO: 32.6 PG (ref 26–35)
MCHC RBC AUTO-ENTMCNC: 34.8 % (ref 32–34.5)
MCV RBC AUTO: 93.6 FL (ref 80–99.9)
MONOCYTES ABSOLUTE: 0.63 E9/L (ref 0.1–0.95)
MONOCYTES RELATIVE PERCENT: 7.4 % (ref 2–12)
NEUTROPHILS ABSOLUTE: 4.01 E9/L (ref 1.8–7.3)
NEUTROPHILS RELATIVE PERCENT: 47.3 % (ref 43–80)
NITRITE, URINE: NEGATIVE
PDW BLD-RTO: 12.3 FL (ref 11.5–15)
PH UA: 6 (ref 5–9)
PLATELET # BLD: 175 E9/L (ref 130–450)
PMV BLD AUTO: 11.2 FL (ref 7–12)
POTASSIUM REFLEX MAGNESIUM: 3.9 MMOL/L (ref 3.5–5)
PREALBUMIN: 21 MG/DL (ref 20–40)
PROTEIN UA: NEGATIVE MG/DL
PROTHROMBIN TIME: 11.4 SEC (ref 9.3–12.4)
RBC # BLD: 4.7 E12/L (ref 3.5–5.5)
RBC UA: ABNORMAL /HPF (ref 0–2)
SODIUM BLD-SCNC: 139 MMOL/L (ref 132–146)
SPECIFIC GRAVITY UA: 1.02 (ref 1–1.03)
TOTAL PROTEIN: 6.8 G/DL (ref 6.4–8.3)
UROBILINOGEN, URINE: 0.2 E.U./DL
WBC # BLD: 8.5 E9/L (ref 4.5–11.5)
WBC UA: ABNORMAL /HPF (ref 0–5)

## 2023-02-16 PROCEDURE — 83036 HEMOGLOBIN GLYCOSYLATED A1C: CPT

## 2023-02-16 PROCEDURE — 80053 COMPREHEN METABOLIC PANEL: CPT

## 2023-02-16 PROCEDURE — 85610 PROTHROMBIN TIME: CPT

## 2023-02-16 PROCEDURE — 85730 THROMBOPLASTIN TIME PARTIAL: CPT

## 2023-02-16 PROCEDURE — 87081 CULTURE SCREEN ONLY: CPT

## 2023-02-16 PROCEDURE — 71046 X-RAY EXAM CHEST 2 VIEWS: CPT

## 2023-02-16 PROCEDURE — 81001 URINALYSIS AUTO W/SCOPE: CPT

## 2023-02-16 PROCEDURE — 84134 ASSAY OF PREALBUMIN: CPT

## 2023-02-16 PROCEDURE — 36415 COLL VENOUS BLD VENIPUNCTURE: CPT

## 2023-02-16 PROCEDURE — 85025 COMPLETE CBC W/AUTO DIFF WBC: CPT

## 2023-02-16 PROCEDURE — 87088 URINE BACTERIA CULTURE: CPT

## 2023-02-16 RX ORDER — GINGER ROOT/GINGER ROOT EXT 262.5 MG
1 CAPSULE ORAL DAILY
COMMUNITY

## 2023-02-16 RX ORDER — DORZOLAMIDE HCL 20 MG/ML
2 SOLUTION/ DROPS OPHTHALMIC 3 TIMES DAILY
COMMUNITY

## 2023-02-16 ASSESSMENT — PAIN DESCRIPTION - ORIENTATION: ORIENTATION: LEFT

## 2023-02-16 ASSESSMENT — PAIN DESCRIPTION - LOCATION: LOCATION: KNEE

## 2023-02-16 ASSESSMENT — PAIN DESCRIPTION - DESCRIPTORS: DESCRIPTORS: SORE;ACHING

## 2023-02-16 ASSESSMENT — PAIN SCALES - GENERAL: PAINLEVEL_OUTOF10: 7

## 2023-02-16 ASSESSMENT — PAIN DESCRIPTION - PAIN TYPE: TYPE: CHRONIC PAIN

## 2023-02-16 ASSESSMENT — PAIN DESCRIPTION - ONSET: ONSET: ON-GOING

## 2023-02-16 NOTE — PROGRESS NOTES
[address] [facility]                                                                                                                    PRE OP INSTRUCTIONS FOR  Dilma Fenton        Date: 2/16/2023    Date of surgery: 2/20/23   Arrival Time: Hospital will call you between 5pm and 7pm Friday with your final arrival time for surgery    Do not eat or drink anything after midnight prior to surgery. This includes no water, chewing gum, mints or ice chips. Take the following medications with a small sip of water on the morning of Surgery: Eye drops     Diabetics may take evening dose of insulin but none after midnight. If you feel symptomatic or low blood sugar morning of surgery drink 1-2 ounces of apple juice only. Aspirin, Ibuprofen, Advil, Naproxen, Vitamin E and other Anti-inflammatory products should be stopped  before surgery  as directed by your physician. Take Tylenol only unless instructed otherwise by your surgeon. Check with your Doctor regarding stopping Plavix, Coumadin, Lovenox, Eliquis, Effient, or other blood thinners. Do not smoke,use illicit drugs and do not drink any alcoholic beverages 24 hours prior to surgery. You may brush your teeth the morning of surgery. DO NOT SWALLOW WATER    You MUST make arrangements for a responsible adult to take you home after your surgery. You will not be allowed to leave alone or drive yourself home. It is strongly suggested someone stay with you the first 24 hrs. Your surgery will be cancelled if you do not have a ride home. PEDIATRIC PATIENTS ONLY:  A parent/legal guardian must accompany a child scheduled for surgery and plan to stay at the hospital until the child is discharged. Please do not bring other children with you.     Please wear simple, loose fitting clothing to the hospital.  Mi Morton not bring valuables (money, credit cards, checkbooks, etc.) Do not wear any makeup (including no eye makeup) or nail polish on your fingers or toes.    DO NOT wear any jewelry or piercings on day of surgery. All body piercing jewelry must be removed. Shower the night before surgery with __x_Antibacterial soap /COSME WIPES___x_____    TOTAL JOINT REPLACEMENT/HYSTERECTOMY PATIENTS ONLY---Remember to bring Blood Bank bracelet to the hospital on the day of surgery. If you have a Living Will and Durable Power of  for Healthcare, please bring in a copy. If appropriate bring crutches, inspirex, WALKER, CANE etc... Notify your Surgeon if you develop any illness between now and surgery time, cough, cold, fever, sore throat, nausea, vomiting, etc.  Please notify your surgeon if you experience dizziness, shortness of breath or blurred vision between now & the time of your surgery. If you have _x__dentures, they will be removed before going to the OR; we will provide you a container. If you wear ___contact lenses or _x__glasses, they will be removed; please bring a case for them. To provide excellent care visitors will be limited to 2 in the room at any given time. Please bring picture ID and insurance card. Sleep apnea patients need to bring CPAP SETTINGS to hospital on day of surgery. During flu season no children under the age of 15 are permitted in the hospital for the safety of all patients. Other                   Please call AMBULATORY CARE if you have any further questions.    [address] [facility]     [address]

## 2023-02-16 NOTE — ANESTHESIA PRE PROCEDURE
Department of Anesthesiology  Preprocedure Note       Name:  Vida Royal   Age:  [de-identified] y.o.  :  1945                                          MRN:  [id number]         Date:  2023      Surgeon: Connie Ulloa): Jeanne Johnson DO    Procedure: Procedure(s):  LEFT KNEE TOTAL KNEE ARTHROPLASTY-23 [facility] AND NEPHEW    Medications prior to admission:   Prior to Admission medications    Medication Sig Start Date End Date Taking?  Authorizing Provider   brimonidine (ALPHAGAN P) 0.1 % SOLN Place 1 drop into both eyes 2 times daily    Historical Provider, MD   dorzolamide (TRUSOPT) 2 % ophthalmic solution Place 2 drops into both eyes 3 times daily    Historical Provider, MD   LATANOPROST OP Place 1 drop into both eyes nightly    Historical Provider, MD   timolol (BETIMOL) 0.25 % ophthalmic solution Place 1 drop into the right eye 2 times daily    Historical Provider, MD   calcium carb-cholecalciferol (CALCIUM 600+D) 600-20 MG-MCG TABS Take 1 tablet by mouth daily    Historical Provider, MD   CPAP Machine MISC by Does not apply route nightly    Historical Provider, MD   Omega-3 Fatty Acids (FISH OIL BURP-LESS PO) Take by mouth daily    Historical Provider, MD   simvastatin (ZOCOR) 20 MG tablet Take 20 mg by mouth every evening    Historical Provider, MD   ENALAPRIL MALEATE PO Take 10 mg by mouth 2 times daily     Historical Provider, MD       Current medications:    Current Facility-Administered Medications   Medication Dose Route Frequency Provider Last Rate Last Admin    ortho mix injection   Injection On Call TANIA Mac CNP        sodium chloride flush 0.9 % injection 5-40 mL  5-40 mL IntraVENous 2 times per day TANIA Mac CNP        sodium chloride flush 0.9 % injection 5-40 mL  5-40 mL IntraVENous PRN TANIA Mac - CNP        0.9 % sodium chloride infusion   IntraVENous PRN TANIA Mac CNP        ceFAZolin (ANCEF) 2,000 mg in sterile water 20 mL IV syringe  2,000 mg IntraVENous On Call to TANIA Young CNP        scopolamine (TRANSDERM-SCOP) transdermal patch 1 patch  1 patch TransDERmal Q72H [place] Corporal, DO   1 patch at 02/20/23 9987       Allergies:  No Known Allergies    Problem List:    Patient Active Problem List   Diagnosis Code    Hallux valgus (acquired), right foot M20.11    S/P total knee arthroplasty, right Z96.651    Primary osteoarthritis of left knee M17.12       Past Medical History:        Diagnosis Date    Breast cancer ([facility] [address] 2000    right/ DCIS    Cancer ([facility] [address]     right breast  had lumpectomy & radiation     GERD (gastroesophageal reflux disease)     History of therapeutic radiation     Hyperlipidemia     Hypertension     PONV (postoperative nausea and vomiting)     with anesthesia    Sleep apnea        Past Surgical History:        Procedure Laterality Date    BREAST BIOPSY Left 1991    benign    BREAST BIOPSY Left 1994    benign    BREAST BIOPSY Right 2000    DCIS    BREAST LUMPECTOMY Right 2000    BREAST SURGERY Right 2000    lumpectomy    CHOLECYSTECTOMY      DILATION AND CURETTAGE OF UTERUS      FOOT SURGERY Right 09/27/2017    HYSTERECTOMY (CERVIX STATUS UNKNOWN)  1998    JOINT REPLACEMENT Right     knee    RACHID STEROTACTIC LOC BREAST BIOPSY RIGHT Right 12/8/2021    RACHID STEROTACTIC LOC BREAST BIOPSY RIGHT 12/8/2021 [facility]       Social History:    Social History     Tobacco Use    Smoking status: Never    Smokeless tobacco: Never   Substance Use Topics    Alcohol use:  No                                Counseling given: Not Answered      Vital Signs (Current):   Vitals:    02/20/23 0509   BP: (!) 115/59   Pulse: 77   Resp: 16   Temp: 98.1 °F (36.7 °C)   TempSrc: Infrared   SpO2: 97%                                              BP Readings from Last 3 Encounters:   02/20/23 (!) 115/59   02/16/23 133/82   09/27/17 (!) 142/47       NPO Status: Time of last liquid consumption: 0430 (sip water)                        Time of last solid consumption: 2000                        Date of last liquid consumption: 02/19/23                        Date of last solid food consumption: 02/19/23    BMI:   Wt Readings from Last 3 Encounters:   02/16/23 139 lb (63 kg)   01/17/23 145 lb (65.8 kg)   04/19/22 142 lb (64.4 kg)     There is no height or weight on file to calculate BMI.    CBC:   Lab Results   Component Value Date/Time    WBC 8.5 02/16/2023 10:10 AM    RBC 4.70 02/16/2023 10:10 AM    HGB 15.3 02/16/2023 10:10 AM    HCT 44.0 02/16/2023 10:10 AM    MCV 93.6 02/16/2023 10:10 AM    RDW 12.3 02/16/2023 10:10 AM     02/16/2023 10:10 AM       CMP:   Lab Results   Component Value Date/Time     02/16/2023 10:10 AM    K 3.9 02/16/2023 10:10 AM     02/16/2023 10:10 AM    CO2 29 02/16/2023 10:10 AM    BUN 19 02/16/2023 10:10 AM    CREATININE 0.7 02/16/2023 10:10 AM    LABGLOM >60 02/16/2023 10:10 AM    GLUCOSE 92 02/16/2023 10:10 AM    GLUCOSE 95 10/11/2010 11:20 AM    PROT 6.8 02/16/2023 10:10 AM    CALCIUM 9.8 02/16/2023 10:10 AM    BILITOT 0.7 02/16/2023 10:10 AM    ALKPHOS 82 02/16/2023 10:10 AM    AST 19 02/16/2023 10:10 AM    ALT 13 02/16/2023 10:10 AM       POC Tests: No results for input(s): POCGLU, POCNA, POCK, POCCL, POCBUN, POCHEMO, POCHCT in the last 72 hours.    Coags:   Lab Results   Component Value Date/Time    PROTIME 11.4 02/16/2023 10:10 AM    INR 1.0 02/16/2023 10:10 AM    APTT 30.0 02/16/2023 10:10 AM       HCG (If Applicable): No results found for: PREGTESTUR, PREGSERUM, HCG, HCGQUANT     ABGs: No results found for: PHART, PO2ART, RAB1IAY, RIL7NQG, BEART, T1MTRPOZ     Type & Screen (If Applicable):  No results found for: LABABO, LABRH    Drug/Infectious Status (If Applicable):  No results found for: HIV, HEPCAB    COVID-19 Screening (If Applicable): No results found for: COVID19        Anesthesia Evaluation  Patient summary reviewed   history of anesthetic complications:  PONV.  Airway: Mallampati: III  TM distance: >3 FB   Neck ROM: full  Mouth opening: > = 3 FB   Dental:      Comment: Dentition intact, upper left partial, missing multiple molars, denies any loose teeth. Pulmonary: breath sounds clear to auscultation  (+) sleep apnea: on CPAP,                             Cardiovascular:    (+) hypertension:, hyperlipidemia        Rhythm: regular  Rate: normal                    Neuro/Psych:   Negative Neuro/Psych ROS              GI/Hepatic/Renal:   (+) GERD:,           Endo/Other:    (+) : arthritis: OA., malignancy/cancer ( right breast  had lumpectomy & radiation ). Abdominal:             Vascular: negative vascular ROS. Other Findings:           Anesthesia Plan      spinal     ASA 3     (Left Adductor canal nerve block. Back -up general.)  Induction: intravenous. MIPS: Postoperative opioids intended and Prophylactic antiemetics administered. Anesthetic plan and risks discussed with patient. Plan discussed with CRNA. Post-op pain plan if not by surgeon: single peripheral nerve block            Maninder Ibarra MD   2/16/2023    DOS STAFF ADDENDUM:    Pt seen and examined, chart reviewed (including anesthesia, drug and allergy history). Anesthetic plan, risks, benefits, alternatives, and personnel involved discussed with patient. Patient verbalized an understanding and agrees to proceed. Plan discussed with care team members and agreed upon. Aguilar Liu MD  Staff Anesthesiologist  6:19 AM      Chart Review:  Chart reviewed per routine on February 20, 2023 at 6:47 AM by TANIA Montez CRNA. Above represents information available via shared medical record including previous anesthesia history, drug and allergy history.     TANIA Montez CRNA

## 2023-02-16 NOTE — PROGRESS NOTES
Patient and  attended preoperative Total Joint Camp on 2/16/2023. Patient is scheduled to have an elective knee replacement. Patient was educated regarding Disease Process, Medications, Smoking Cessation, Oxygenation, Incentive Spirometry and Deep Breath and Cough, signs and symptoms of postoperative joint infection that include: Fever, Chills, Pain Control, Drainage and Redness, post-op follow up with orthopaedic surgeon, dressing removal, staple removal, ambulatory devices which include a wheeled walker and cane, bed mobility, correct anatomical alignment, active range of motion, proper transferring technique, incision care, infection prevention measures, non-pharmacologic comfort measures, notification of inadequate pain control measures, pain scale for assessing level of pain, pharmacologic pain management, relaxation techniques.

## 2023-02-17 LAB — MRSA CULTURE ONLY: NORMAL

## 2023-02-17 NOTE — CARE COORDINATION
02/17/23 1508   Social/Functional History   Lives With Spouse   Type of [address] One level   Home Access Stairs to enter without rails   Entrance Stairs - Number of Steps 2 steps to enter   Bathroom Shower/Tub Walk-in shower   Bathroom Toilet Handicap height   [facility] Help From Family   Condition of Participation: Discharge Planning   The Plan for Transition of Care is related to the following treatment goals: HHC/DME   The Patient and/or Patient Representative was provided with a Choice of Provider? Patient   The Patient and/Or Patient Representative agree with the Discharge Plan? Yes   Freedom of Choice list was provided with basic dialogue that supports the patient's individualized plan of care/goals, treatment preferences, and shares the quality data associated with the providers? Yes   2/17/2023: SS Note/Discharge planning:  Met with pt in PAT, pt having surgery for a total knee arthroplasty on 2/20 , explained sw role for transition of care and reviewed rehab options for [facility], pt plans to return home day of surgery from [facility], pt's  will help her and provide her transport home, pt prefers [facility], referrals made to Gerald Anton at [facility] for home PT and to Deepa at [facility] for a w/w for delivery to [facility] between 10am-2pm, pt prefers to get a shower seat on her own if needed, will follow for home therapy and dme orders. sw to follow post-op as needed.  Electronically signed by GEETHA Kirkpatrick on 2/17/2023 at 3:07 PM

## 2023-02-17 NOTE — ACP (ADVANCE CARE PLANNING)
Advance Care Planning   Healthcare Decision Maker:    Primary Decision Maker: Tanya Ala - [phone]    Secondary Decision Maker: Mckenzie Martin - [phone]    Click here to complete Healthcare Decision Makers including selection of the Healthcare Decision Maker Relationship (ie \"Primary\"). Today we documented Decision Maker(s) consistent with Legal Next of Kin hierarchy.

## 2023-02-18 LAB — URINE CULTURE, ROUTINE: NORMAL

## 2023-02-20 ENCOUNTER — ANESTHESIA (OUTPATIENT)
Dept: OPERATING ROOM | Age: 78
End: 2023-02-20
Payer: MEDICARE

## 2023-02-20 ENCOUNTER — HOSPITAL ENCOUNTER (OUTPATIENT)
Age: 78
Setting detail: OUTPATIENT SURGERY
Discharge: HOME | End: 2023-02-20
Attending: ORTHOPAEDIC SURGERY | Admitting: ORTHOPAEDIC SURGERY
Payer: MEDICARE

## 2023-02-20 ENCOUNTER — APPOINTMENT (OUTPATIENT)
Dept: GENERAL RADIOLOGY | Age: 78
End: 2023-02-20
Attending: ORTHOPAEDIC SURGERY
Payer: MEDICARE

## 2023-02-20 VITALS
OXYGEN SATURATION: 97 % | HEART RATE: 85 BPM | DIASTOLIC BLOOD PRESSURE: 68 MMHG | TEMPERATURE: 96.9 F | SYSTOLIC BLOOD PRESSURE: 140 MMHG | RESPIRATION RATE: 20 BRPM

## 2023-02-20 DIAGNOSIS — M17.12 PRIMARY OSTEOARTHRITIS OF LEFT KNEE: Primary | ICD-10-CM

## 2023-02-20 DIAGNOSIS — M17.12 OSTEOARTHRITIS OF LEFT KNEE: ICD-10-CM

## 2023-02-20 LAB
HCT VFR BLD CALC: 44 % (ref 34–48)
HEMOGLOBIN: 15 G/DL (ref 11.5–15.5)

## 2023-02-20 PROCEDURE — A4217 STERILE WATER/SALINE, 500 ML: HCPCS | Performed by: ORTHOPAEDIC SURGERY

## 2023-02-20 PROCEDURE — 97535 SELF CARE MNGMENT TRAINING: CPT

## 2023-02-20 PROCEDURE — C1776 JOINT DEVICE (IMPLANTABLE): HCPCS | Performed by: ORTHOPAEDIC SURGERY

## 2023-02-20 PROCEDURE — 6370000000 HC RX 637 (ALT 250 FOR IP): Performed by: NURSE PRACTITIONER

## 2023-02-20 PROCEDURE — 36415 COLL VENOUS BLD VENIPUNCTURE: CPT

## 2023-02-20 PROCEDURE — 97110 THERAPEUTIC EXERCISES: CPT | Performed by: PHYSICAL THERAPIST

## 2023-02-20 PROCEDURE — 97165 OT EVAL LOW COMPLEX 30 MIN: CPT

## 2023-02-20 PROCEDURE — 6360000002 HC RX W HCPCS: Performed by: NURSE PRACTITIONER

## 2023-02-20 PROCEDURE — 85014 HEMATOCRIT: CPT

## 2023-02-20 PROCEDURE — 3600000015 HC SURGERY LEVEL 5 ADDTL 15MIN: Performed by: ORTHOPAEDIC SURGERY

## 2023-02-20 PROCEDURE — 6360000002 HC RX W HCPCS: Performed by: ORTHOPAEDIC SURGERY

## 2023-02-20 PROCEDURE — 6360000002 HC RX W HCPCS: Performed by: NURSE ANESTHETIST, CERTIFIED REGISTERED

## 2023-02-20 PROCEDURE — 6370000000 HC RX 637 (ALT 250 FOR IP): Performed by: ORTHOPAEDIC SURGERY

## 2023-02-20 PROCEDURE — 2500000003 HC RX 250 WO HCPCS: Performed by: ANESTHESIOLOGY

## 2023-02-20 PROCEDURE — 2500000003 HC RX 250 WO HCPCS: Performed by: NURSE PRACTITIONER

## 2023-02-20 PROCEDURE — 7100000011 HC PHASE II RECOVERY - ADDTL 15 MIN: Performed by: ORTHOPAEDIC SURGERY

## 2023-02-20 PROCEDURE — 27447 TOTAL KNEE ARTHROPLASTY: CPT | Performed by: ORTHOPAEDIC SURGERY

## 2023-02-20 PROCEDURE — 2709999900 HC NON-CHARGEABLE SUPPLY: Performed by: ORTHOPAEDIC SURGERY

## 2023-02-20 PROCEDURE — 88305 TISSUE EXAM BY PATHOLOGIST: CPT

## 2023-02-20 PROCEDURE — 88311 DECALCIFY TISSUE: CPT

## 2023-02-20 PROCEDURE — 3600000005 HC SURGERY LEVEL 5 BASE: Performed by: ORTHOPAEDIC SURGERY

## 2023-02-20 PROCEDURE — 64447 NJX AA&/STRD FEMORAL NRV IMG: CPT | Performed by: ANESTHESIOLOGY

## 2023-02-20 PROCEDURE — 73560 X-RAY EXAM OF KNEE 1 OR 2: CPT

## 2023-02-20 PROCEDURE — 2580000003 HC RX 258: Performed by: ORTHOPAEDIC SURGERY

## 2023-02-20 PROCEDURE — 2580000003 HC RX 258: Performed by: NURSE PRACTITIONER

## 2023-02-20 PROCEDURE — 97161 PT EVAL LOW COMPLEX 20 MIN: CPT | Performed by: PHYSICAL THERAPIST

## 2023-02-20 PROCEDURE — 3700000000 HC ANESTHESIA ATTENDED CARE: Performed by: ORTHOPAEDIC SURGERY

## 2023-02-20 PROCEDURE — 2500000003 HC RX 250 WO HCPCS: Performed by: NURSE ANESTHETIST, CERTIFIED REGISTERED

## 2023-02-20 PROCEDURE — 85018 HEMOGLOBIN: CPT

## 2023-02-20 PROCEDURE — 3700000001 HC ADD 15 MINUTES (ANESTHESIA): Performed by: ORTHOPAEDIC SURGERY

## 2023-02-20 PROCEDURE — 6360000002 HC RX W HCPCS

## 2023-02-20 PROCEDURE — 6360000002 HC RX W HCPCS: Performed by: ANESTHESIOLOGY

## 2023-02-20 PROCEDURE — C1713 ANCHOR/SCREW BN/BN,TIS/BN: HCPCS | Performed by: ORTHOPAEDIC SURGERY

## 2023-02-20 PROCEDURE — 97530 THERAPEUTIC ACTIVITIES: CPT

## 2023-02-20 PROCEDURE — 2580000003 HC RX 258: Performed by: NURSE ANESTHETIST, CERTIFIED REGISTERED

## 2023-02-20 PROCEDURE — 7100000001 HC PACU RECOVERY - ADDTL 15 MIN: Performed by: ORTHOPAEDIC SURGERY

## 2023-02-20 PROCEDURE — 7100000010 HC PHASE II RECOVERY - FIRST 15 MIN: Performed by: ORTHOPAEDIC SURGERY

## 2023-02-20 PROCEDURE — 97530 THERAPEUTIC ACTIVITIES: CPT | Performed by: PHYSICAL THERAPIST

## 2023-02-20 PROCEDURE — 7100000000 HC PACU RECOVERY - FIRST 15 MIN: Performed by: ORTHOPAEDIC SURGERY

## 2023-02-20 DEVICE — LEGION PS HIGH FLEX XLPE SZ 3-4 10MM
Type: IMPLANTABLE DEVICE | Site: KNEE | Status: FUNCTIONAL
Brand: LEGION

## 2023-02-20 DEVICE — GENESIS II NON-POROUS TIBIAL                                    BASEPLATE SIZE 3 LEFT
Type: IMPLANTABLE DEVICE | Site: KNEE | Status: FUNCTIONAL
Brand: GENESIS II

## 2023-02-20 DEVICE — LEGION NARROW POSTERIOR STABILIZED                                    OXINIUM SIZE 4N LEFT
Type: IMPLANTABLE DEVICE | Site: KNEE | Status: FUNCTIONAL
Brand: LEGION

## 2023-02-20 DEVICE — GEN II 7.5MM RESUR PAT 29MM
Type: IMPLANTABLE DEVICE | Site: KNEE | Status: FUNCTIONAL
Brand: GENESIS II

## 2023-02-20 DEVICE — FULL DOSE BONE CEMENT, 10 PACK CATALOG NUMBER IS 6191-1-010
Type: IMPLANTABLE DEVICE | Site: KNEE | Status: FUNCTIONAL
Brand: SIMPLEX

## 2023-02-20 DEVICE — KNEE K1 TOT HEMI STD CEM IMPL CAPPED K1 SN: Type: IMPLANTABLE DEVICE | Site: KNEE | Status: FUNCTIONAL

## 2023-02-20 RX ORDER — MORPHINE SULFATE 2 MG/ML
2 INJECTION, SOLUTION INTRAMUSCULAR; INTRAVENOUS
OUTPATIENT
Start: 2023-02-20

## 2023-02-20 RX ORDER — LIDOCAINE HYDROCHLORIDE 20 MG/ML
INJECTION, SOLUTION EPIDURAL; INFILTRATION; INTRACAUDAL; PERINEURAL PRN
Status: DISCONTINUED | OUTPATIENT
Start: 2023-02-20 | End: 2023-02-20 | Stop reason: SDUPTHER

## 2023-02-20 RX ORDER — MELOXICAM 7.5 MG/1
3.75 TABLET ORAL DAILY
Status: DISCONTINUED | OUTPATIENT
Start: 2023-02-20 | End: 2023-02-20 | Stop reason: HOSPADM

## 2023-02-20 RX ORDER — ONDANSETRON 4 MG/1
4 TABLET, ORALLY DISINTEGRATING ORAL EVERY 8 HOURS PRN
Status: DISCONTINUED | OUTPATIENT
Start: 2023-02-20 | End: 2023-02-20 | Stop reason: HOSPADM

## 2023-02-20 RX ORDER — ROPIVACAINE HYDROCHLORIDE 5 MG/ML
30 INJECTION, SOLUTION EPIDURAL; INFILTRATION; PERINEURAL ONCE
Status: COMPLETED | OUTPATIENT
Start: 2023-02-20 | End: 2023-02-20

## 2023-02-20 RX ORDER — GLYCOPYRROLATE 0.2 MG/ML
INJECTION INTRAMUSCULAR; INTRAVENOUS PRN
Status: DISCONTINUED | OUTPATIENT
Start: 2023-02-20 | End: 2023-02-20 | Stop reason: SDUPTHER

## 2023-02-20 RX ORDER — FENTANYL CITRATE 50 UG/ML
INJECTION, SOLUTION INTRAMUSCULAR; INTRAVENOUS PRN
Status: DISCONTINUED | OUTPATIENT
Start: 2023-02-20 | End: 2023-02-20 | Stop reason: SDUPTHER

## 2023-02-20 RX ORDER — PROCHLORPERAZINE EDISYLATE 5 MG/ML
5 INJECTION INTRAMUSCULAR; INTRAVENOUS
Status: DISCONTINUED | OUTPATIENT
Start: 2023-02-20 | End: 2023-02-20 | Stop reason: HOSPADM

## 2023-02-20 RX ORDER — WATER 1000 ML/1000ML
INJECTION, SOLUTION INTRAVENOUS
Status: DISCONTINUED
Start: 2023-02-20 | End: 2023-02-20 | Stop reason: HOSPADM

## 2023-02-20 RX ORDER — CELECOXIB 100 MG/1
100 CAPSULE ORAL 2 TIMES DAILY
Qty: 60 CAPSULE | Refills: 0 | Status: SHIPPED | OUTPATIENT
Start: 2023-02-20 | End: 2023-03-22

## 2023-02-20 RX ORDER — GABAPENTIN 100 MG/1
100 CAPSULE ORAL 3 TIMES DAILY
Qty: 90 CAPSULE | Refills: 0 | Status: SHIPPED | OUTPATIENT
Start: 2023-02-20 | End: 2023-03-22

## 2023-02-20 RX ORDER — DEXTROSE AND SODIUM CHLORIDE 5; .45 G/100ML; G/100ML
INJECTION, SOLUTION INTRAVENOUS CONTINUOUS
OUTPATIENT
Start: 2023-02-20

## 2023-02-20 RX ORDER — MORPHINE SULFATE 2 MG/ML
2 INJECTION, SOLUTION INTRAMUSCULAR; INTRAVENOUS EVERY 5 MIN PRN
Status: DISCONTINUED | OUTPATIENT
Start: 2023-02-20 | End: 2023-02-20 | Stop reason: HOSPADM

## 2023-02-20 RX ORDER — DORZOLAMIDE HCL 20 MG/ML
2 SOLUTION/ DROPS OPHTHALMIC 3 TIMES DAILY
Status: CANCELLED | OUTPATIENT
Start: 2023-02-20

## 2023-02-20 RX ORDER — ROPIVACAINE HYDROCHLORIDE 5 MG/ML
INJECTION, SOLUTION EPIDURAL; INFILTRATION; PERINEURAL
Status: COMPLETED | OUTPATIENT
Start: 2023-02-20 | End: 2023-02-20

## 2023-02-20 RX ORDER — DEXAMETHASONE SODIUM PHOSPHATE 10 MG/ML
8 INJECTION INTRAMUSCULAR; INTRAVENOUS ONCE
Status: COMPLETED | OUTPATIENT
Start: 2023-02-20 | End: 2023-02-20

## 2023-02-20 RX ORDER — ATORVASTATIN CALCIUM 10 MG/1
10 TABLET, FILM COATED ORAL DAILY
Status: CANCELLED | OUTPATIENT
Start: 2023-02-20

## 2023-02-20 RX ORDER — ACETAMINOPHEN 500 MG
1000 TABLET ORAL ONCE
Status: COMPLETED | OUTPATIENT
Start: 2023-02-20 | End: 2023-02-20

## 2023-02-20 RX ORDER — ONDANSETRON 2 MG/ML
4 INJECTION INTRAMUSCULAR; INTRAVENOUS
Status: DISCONTINUED | OUTPATIENT
Start: 2023-02-20 | End: 2023-02-20 | Stop reason: HOSPADM

## 2023-02-20 RX ORDER — ROPIVACAINE HYDROCHLORIDE 5 MG/ML
INJECTION, SOLUTION EPIDURAL; INFILTRATION; PERINEURAL
Status: COMPLETED
Start: 2023-02-20 | End: 2023-02-20

## 2023-02-20 RX ORDER — ASPIRIN 325 MG
325 TABLET, DELAYED RELEASE (ENTERIC COATED) ORAL 2 TIMES DAILY
Qty: 30 TABLET | Refills: 0 | Status: SHIPPED | OUTPATIENT
Start: 2023-02-20 | End: 2024-02-20

## 2023-02-20 RX ORDER — GINGER ROOT/GINGER ROOT EXT 262.5 MG
1 CAPSULE ORAL DAILY
Status: CANCELLED | OUTPATIENT
Start: 2023-02-20

## 2023-02-20 RX ORDER — OXYCODONE HYDROCHLORIDE AND ACETAMINOPHEN 5; 325 MG/1; MG/1
1 TABLET ORAL EVERY 6 HOURS PRN
Qty: 28 TABLET | Refills: 0 | Status: SHIPPED | OUTPATIENT
Start: 2023-02-20 | End: 2023-02-27

## 2023-02-20 RX ORDER — FENTANYL CITRATE 50 UG/ML
50 INJECTION, SOLUTION INTRAMUSCULAR; INTRAVENOUS ONCE
Status: COMPLETED | OUTPATIENT
Start: 2023-02-20 | End: 2023-02-20

## 2023-02-20 RX ORDER — SCOLOPAMINE TRANSDERMAL SYSTEM 1 MG/1
1 PATCH, EXTENDED RELEASE TRANSDERMAL
Status: DISCONTINUED | OUTPATIENT
Start: 2023-02-20 | End: 2023-02-20 | Stop reason: HOSPADM

## 2023-02-20 RX ORDER — MIDAZOLAM HYDROCHLORIDE 1 MG/ML
1 INJECTION INTRAMUSCULAR; INTRAVENOUS ONCE
Status: COMPLETED | OUTPATIENT
Start: 2023-02-20 | End: 2023-02-20

## 2023-02-20 RX ORDER — KETOROLAC TROMETHAMINE 15 MG/ML
INJECTION, SOLUTION INTRAMUSCULAR; INTRAVENOUS
Status: DISCONTINUED
Start: 2023-02-20 | End: 2023-02-20 | Stop reason: HOSPADM

## 2023-02-20 RX ORDER — ACETAMINOPHEN 325 MG/1
650 TABLET ORAL EVERY 6 HOURS
OUTPATIENT
Start: 2023-02-21

## 2023-02-20 RX ORDER — CEFAZOLIN 2 G/1
INJECTION, POWDER, FOR SOLUTION INTRAMUSCULAR; INTRAVENOUS
Status: DISCONTINUED
Start: 2023-02-20 | End: 2023-02-20 | Stop reason: HOSPADM

## 2023-02-20 RX ORDER — SODIUM CHLORIDE 0.9 % (FLUSH) 0.9 %
5-40 SYRINGE (ML) INJECTION EVERY 12 HOURS SCHEDULED
OUTPATIENT
Start: 2023-02-20

## 2023-02-20 RX ORDER — SODIUM CHLORIDE, SODIUM LACTATE, POTASSIUM CHLORIDE, CALCIUM CHLORIDE 600; 310; 30; 20 MG/100ML; MG/100ML; MG/100ML; MG/100ML
INJECTION, SOLUTION INTRAVENOUS CONTINUOUS PRN
Status: DISCONTINUED | OUTPATIENT
Start: 2023-02-20 | End: 2023-02-20 | Stop reason: SDUPTHER

## 2023-02-20 RX ORDER — SODIUM CHLORIDE 0.9 % (FLUSH) 0.9 %
5-40 SYRINGE (ML) INJECTION EVERY 12 HOURS SCHEDULED
Status: DISCONTINUED | OUTPATIENT
Start: 2023-02-20 | End: 2023-02-20 | Stop reason: HOSPADM

## 2023-02-20 RX ORDER — MIDAZOLAM HYDROCHLORIDE 1 MG/ML
2 INJECTION INTRAMUSCULAR; INTRAVENOUS
Status: DISCONTINUED | OUTPATIENT
Start: 2023-02-20 | End: 2023-02-20 | Stop reason: HOSPADM

## 2023-02-20 RX ORDER — FENTANYL CITRATE 50 UG/ML
INJECTION, SOLUTION INTRAMUSCULAR; INTRAVENOUS
Status: COMPLETED
Start: 2023-02-20 | End: 2023-02-20

## 2023-02-20 RX ORDER — ONDANSETRON 2 MG/ML
4 INJECTION INTRAMUSCULAR; INTRAVENOUS EVERY 6 HOURS PRN
Status: DISCONTINUED | OUTPATIENT
Start: 2023-02-20 | End: 2023-02-20 | Stop reason: HOSPADM

## 2023-02-20 RX ORDER — SODIUM CHLORIDE 0.9 % (FLUSH) 0.9 %
5-40 SYRINGE (ML) INJECTION PRN
Status: DISCONTINUED | OUTPATIENT
Start: 2023-02-20 | End: 2023-02-20 | Stop reason: HOSPADM

## 2023-02-20 RX ORDER — IPRATROPIUM BROMIDE AND ALBUTEROL SULFATE 2.5; .5 MG/3ML; MG/3ML
1 SOLUTION RESPIRATORY (INHALATION)
Status: DISCONTINUED | OUTPATIENT
Start: 2023-02-20 | End: 2023-02-20 | Stop reason: HOSPADM

## 2023-02-20 RX ORDER — PROPOFOL 10 MG/ML
INJECTION, EMULSION INTRAVENOUS PRN
Status: DISCONTINUED | OUTPATIENT
Start: 2023-02-20 | End: 2023-02-20 | Stop reason: SDUPTHER

## 2023-02-20 RX ORDER — SODIUM CHLORIDE 9 MG/ML
INJECTION, SOLUTION INTRAVENOUS PRN
Status: DISCONTINUED | OUTPATIENT
Start: 2023-02-20 | End: 2023-02-20 | Stop reason: HOSPADM

## 2023-02-20 RX ORDER — FENTANYL CITRATE 50 UG/ML
INJECTION, SOLUTION INTRAMUSCULAR; INTRAVENOUS
Status: COMPLETED | OUTPATIENT
Start: 2023-02-20 | End: 2023-02-20

## 2023-02-20 RX ORDER — SODIUM CHLORIDE 0.9 % (FLUSH) 0.9 %
5-40 SYRINGE (ML) INJECTION PRN
OUTPATIENT
Start: 2023-02-20

## 2023-02-20 RX ORDER — HYDRALAZINE HYDROCHLORIDE 20 MG/ML
10 INJECTION INTRAMUSCULAR; INTRAVENOUS
Status: DISCONTINUED | OUTPATIENT
Start: 2023-02-20 | End: 2023-02-20 | Stop reason: HOSPADM

## 2023-02-20 RX ORDER — LABETALOL HYDROCHLORIDE 5 MG/ML
10 INJECTION, SOLUTION INTRAVENOUS
Status: DISCONTINUED | OUTPATIENT
Start: 2023-02-20 | End: 2023-02-20 | Stop reason: HOSPADM

## 2023-02-20 RX ORDER — CELECOXIB 100 MG/1
100 CAPSULE ORAL ONCE
Status: COMPLETED | OUTPATIENT
Start: 2023-02-20 | End: 2023-02-20

## 2023-02-20 RX ORDER — VANCOMYCIN HYDROCHLORIDE 1 G/20ML
INJECTION, POWDER, LYOPHILIZED, FOR SOLUTION INTRAVENOUS PRN
Status: DISCONTINUED | OUTPATIENT
Start: 2023-02-20 | End: 2023-02-20 | Stop reason: ALTCHOICE

## 2023-02-20 RX ORDER — DIPHENHYDRAMINE HYDROCHLORIDE 50 MG/ML
12.5 INJECTION INTRAMUSCULAR; INTRAVENOUS
Status: DISCONTINUED | OUTPATIENT
Start: 2023-02-20 | End: 2023-02-20 | Stop reason: HOSPADM

## 2023-02-20 RX ORDER — MIDAZOLAM HYDROCHLORIDE 1 MG/ML
INJECTION INTRAMUSCULAR; INTRAVENOUS
Status: COMPLETED
Start: 2023-02-20 | End: 2023-02-20

## 2023-02-20 RX ORDER — KETOROLAC TROMETHAMINE 30 MG/ML
15 INJECTION, SOLUTION INTRAMUSCULAR; INTRAVENOUS
Status: COMPLETED | OUTPATIENT
Start: 2023-02-20 | End: 2023-02-20

## 2023-02-20 RX ORDER — OXYCODONE HYDROCHLORIDE 5 MG/1
10 TABLET ORAL EVERY 4 HOURS PRN
Status: DISCONTINUED | OUTPATIENT
Start: 2023-02-20 | End: 2023-02-20 | Stop reason: HOSPADM

## 2023-02-20 RX ORDER — BUPIVACAINE HYDROCHLORIDE 7.5 MG/ML
INJECTION, SOLUTION INTRASPINAL
Status: COMPLETED | OUTPATIENT
Start: 2023-02-20 | End: 2023-02-20

## 2023-02-20 RX ADMIN — MIDAZOLAM HYDROCHLORIDE 1 MG: 1 INJECTION INTRAMUSCULAR; INTRAVENOUS at 06:49

## 2023-02-20 RX ADMIN — MELOXICAM 3.75 MG: 7.5 TABLET ORAL at 12:21

## 2023-02-20 RX ADMIN — FENTANYL CITRATE 50 MCG: 50 INJECTION, SOLUTION INTRAMUSCULAR; INTRAVENOUS at 06:49

## 2023-02-20 RX ADMIN — ROPIVACAINE HYDROCHLORIDE 30 ML: 5 INJECTION, SOLUTION EPIDURAL; INFILTRATION; PERINEURAL at 06:55

## 2023-02-20 RX ADMIN — PROPOFOL INJECTABLE EMULSION 50 MG: 10 INJECTION, EMULSION INTRAVENOUS at 07:24

## 2023-02-20 RX ADMIN — MIDAZOLAM 1 MG: 1 INJECTION INTRAMUSCULAR; INTRAVENOUS at 06:49

## 2023-02-20 RX ADMIN — CEFAZOLIN 2000 MG: 2 INJECTION, POWDER, FOR SOLUTION INTRAMUSCULAR; INTRAVENOUS at 10:05

## 2023-02-20 RX ADMIN — GLYCOPYRROLATE 0.2 MG: 0.2 INJECTION INTRAMUSCULAR; INTRAVENOUS at 07:30

## 2023-02-20 RX ADMIN — ACETAMINOPHEN 1000 MG: 500 TABLET, FILM COATED ORAL at 05:36

## 2023-02-20 RX ADMIN — PHENYLEPHRINE HYDROCHLORIDE 100 MCG: 10 INJECTION INTRAVENOUS at 08:00

## 2023-02-20 RX ADMIN — FENTANYL CITRATE 50 MCG: 50 INJECTION INTRAMUSCULAR; INTRAVENOUS at 06:49

## 2023-02-20 RX ADMIN — DEXAMETHASONE SODIUM PHOSPHATE 8 MG: 10 INJECTION INTRAMUSCULAR; INTRAVENOUS at 05:36

## 2023-02-20 RX ADMIN — OXYCODONE HYDROCHLORIDE 10 MG: 5 TABLET ORAL at 13:38

## 2023-02-20 RX ADMIN — PHENYLEPHRINE HYDROCHLORIDE 100 MCG: 10 INJECTION INTRAVENOUS at 07:30

## 2023-02-20 RX ADMIN — CELECOXIB 100 MG: 100 CAPSULE ORAL at 05:36

## 2023-02-20 RX ADMIN — CEFAZOLIN 2000 MG: 2 INJECTION, POWDER, FOR SOLUTION INTRAMUSCULAR; INTRAVENOUS at 07:00

## 2023-02-20 RX ADMIN — PROPOFOL INJECTABLE EMULSION 50 MCG/KG/MIN: 10 INJECTION, EMULSION INTRAVENOUS at 07:25

## 2023-02-20 RX ADMIN — SODIUM CHLORIDE, POTASSIUM CHLORIDE, SODIUM LACTATE AND CALCIUM CHLORIDE: 600; 310; 30; 20 INJECTION, SOLUTION INTRAVENOUS at 07:38

## 2023-02-20 RX ADMIN — KETOROLAC TROMETHAMINE 15 MG: 30 INJECTION, SOLUTION INTRAMUSCULAR at 10:09

## 2023-02-20 RX ADMIN — LIDOCAINE HYDROCHLORIDE 2 ML: 20 INJECTION, SOLUTION EPIDURAL; INFILTRATION; INTRACAUDAL; PERINEURAL at 07:24

## 2023-02-20 RX ADMIN — ROPIVACAINE HYDROCHLORIDE 30 ML: 5 INJECTION, SOLUTION EPIDURAL; INFILTRATION; PERINEURAL at 06:49

## 2023-02-20 RX ADMIN — FENTANYL CITRATE 20 MCG: 50 INJECTION, SOLUTION INTRAMUSCULAR; INTRAVENOUS at 07:21

## 2023-02-20 RX ADMIN — SODIUM CHLORIDE, POTASSIUM CHLORIDE, SODIUM LACTATE AND CALCIUM CHLORIDE: 600; 310; 30; 20 INJECTION, SOLUTION INTRAVENOUS at 06:58

## 2023-02-20 RX ADMIN — FENTANYL CITRATE 25 MCG: 50 INJECTION, SOLUTION INTRAMUSCULAR; INTRAVENOUS at 07:12

## 2023-02-20 RX ADMIN — BUPIVACAINE HYDROCHLORIDE IN DEXTROSE 12 MG: 7.5 INJECTION, SOLUTION SUBARACHNOID at 07:21

## 2023-02-20 ASSESSMENT — PAIN DESCRIPTION - PAIN TYPE
TYPE: ACUTE PAIN;SURGICAL PAIN
TYPE: ACUTE PAIN;SURGICAL PAIN

## 2023-02-20 ASSESSMENT — PAIN DESCRIPTION - LOCATION
LOCATION: KNEE
LOCATION: LEG
LOCATION: LEG
LOCATION: KNEE
LOCATION: KNEE

## 2023-02-20 ASSESSMENT — PAIN SCALES - GENERAL
PAINLEVEL_OUTOF10: 2
PAINLEVEL_OUTOF10: 4
PAINLEVEL_OUTOF10: 2
PAINLEVEL_OUTOF10: 5
PAINLEVEL_OUTOF10: 9
PAINLEVEL_OUTOF10: 0
PAINLEVEL_OUTOF10: 0
PAINLEVEL_OUTOF10: 2
PAINLEVEL_OUTOF10: 0
PAINLEVEL_OUTOF10: 0
PAINLEVEL_OUTOF10: 5
PAINLEVEL_OUTOF10: 9
PAINLEVEL_OUTOF10: 0

## 2023-02-20 ASSESSMENT — PAIN DESCRIPTION - DESCRIPTORS
DESCRIPTORS: SORE
DESCRIPTORS: SORE
DESCRIPTORS: ACHING
DESCRIPTORS: SORE
DESCRIPTORS: ACHING;DISCOMFORT
DESCRIPTORS: DISCOMFORT
DESCRIPTORS: SORE

## 2023-02-20 ASSESSMENT — PAIN DESCRIPTION - FREQUENCY
FREQUENCY: CONTINUOUS
FREQUENCY: CONTINUOUS

## 2023-02-20 ASSESSMENT — PAIN DESCRIPTION - ORIENTATION
ORIENTATION: LEFT

## 2023-02-20 ASSESSMENT — PAIN - FUNCTIONAL ASSESSMENT: PAIN_FUNCTIONAL_ASSESSMENT: 0-10

## 2023-02-20 NOTE — ANESTHESIA PROCEDURE NOTES
Spinal Block    Patient location during procedure: OB  End time: 2/20/2023 7:21 AM  Reason for block: primary anesthetic and at surgeon's request  Staffing  Performed: resident/CRNA   Anesthesiologist: Jayashree Moise MD  Resident/CRNA: TANIA Fisher CRNA  Spinal Block  Patient position: sitting  Prep: Betadine  Patient monitoring: cardiac monitor, continuous pulse ox, continuous capnometry and frequent blood pressure checks  Approach: midline  Location: L3/L4  Provider prep: mask, sterile gloves and sterile gown  Local infiltration: lidocaine  Needle  Needle type: Pencan   Needle gauge: 25 G  Needle length: 3.5 in  Assessment  Sensory level: T4  Swirl obtained: Yes  CSF: clear  Attempts: 1  Hemodynamics: stable  Preanesthetic Checklist  Completed: patient identified, IV checked, site marked, risks and benefits discussed, surgical/procedural consents, equipment checked, pre-op evaluation, timeout performed, anesthesia consent given, oxygen available and monitors applied/VS acknowledged

## 2023-02-20 NOTE — H&P
Updated H&P    Chief Complaint   Patient presents with    Knee Pain       Left Knee x 1 month, no known injury, no previous left knee surgery. Was given Meloxicam from PCP with some relief. Subjective:     Patient ID: Alan Alonzo is a 76 y.o..  female     Knee Pain  Patient complains of left knee pain. This is evaluated as a personal injury. There was not a history of injury. The pain began several years ago. The pain is located medial. She describes  Her symptoms as aching. She has experienced popping, clicking, locking, and giving way in the affected knee. The patient has had pain with kneeling, squating, and climbing stairs. Symptoms improve with rest. The symptoms are worse with activity. The knee has not given out or felt unstable. The patient cannot bend and straighten the knee fully. The patient is active in none. Treatment to date has been ice, heat, Tylenol, without significant relief. The patient is not working. The patients occupation is retired.       Past Medical History        Past Medical History:   Diagnosis Date    Breast cancer ([facility] [address] 2000     right/ DCIS    Cancer ([facility] [address]       right breast  had lumpectomy & radiation     GERD (gastroesophageal reflux disease)      History of therapeutic radiation      Hyperlipidemia      Hypertension      PONV (postoperative nausea and vomiting)       with anesthesia         Past Surgical History         Past Surgical History:   Procedure Laterality Date    BREAST BIOPSY Left 1991     benign    BREAST BIOPSY Left 1994     benign    BREAST BIOPSY Right 2000     DCIS    BREAST LUMPECTOMY Right 2000    BREAST SURGERY Right 2000     lumpectomy    CHOLECYSTECTOMY        DILATION AND CURETTAGE OF UTERUS        FOOT SURGERY Right 09/27/2017    HYSTERECTOMY (CERVIX STATUS UNKNOWN)   1998    JOINT REPLACEMENT Right       knee    RACHID STEROTACTIC LOC BREAST BIOPSY RIGHT Right 12/8/2021     RACHID STEROTACTIC LOC BREAST BIOPSY RIGHT 12/8/2021 [facility]           Current Medication      Current Outpatient Medications:     Omega-3 Fatty Acids (FISH OIL BURP-LESS PO), Take by mouth daily, Disp: , Rfl:     vitamin D (CHOLECALCIFEROL) 1000 UNIT TABS tablet, Take 1,000 Units by mouth daily, Disp: , Rfl:     simvastatin (ZOCOR) 20 MG tablet, Take 20 mg by mouth nightly, Disp: , Rfl:     ENALAPRIL MALEATE PO, Take 10 mg by mouth 2 times daily , Disp: , Rfl:     diclofenac sodium (VOLTAREN) 1 % GEL, Apply 4 g topically 4 times daily, Disp: 480 g, Rfl: 3     No Known Allergies  Social History               Socioeconomic History    Marital status:        Spouse name: Not on file    Number of children: Not on file    Years of education: Not on file    Highest education level: Not on file   Occupational History    Not on file   Tobacco Use    Smoking status: Never    Smokeless tobacco: Never   Substance and Sexual Activity    Alcohol use: No    Drug use: Not on file    Sexual activity: Not on file   Other Topics Concern    Not on file   Social History Narrative    Not on file      Social Determinants of Health      Financial Resource Strain: Not on file   Food Insecurity: Not on file   Transportation Needs: Not on file   Physical Activity: Not on file   Stress: Not on file   Social Connections: Not on file   Intimate Partner Violence: Not on file   Housing Stability: Not on file         Family History         Family History   Problem Relation Age of Onset    Rectal Cancer Mother      Breast Cancer Maternal Aunt           postmenopausal               REVIEW OF SYSTEMS:      General/Constitution:  (-)weight loss, (-)fever, (-)chills, (-)weakness.   Skin: (-) rash,(-) psoriasis,(-) eczema, (-)skin cancer.   Musculoskeletal: (-) fractures,  (-) dislocations,(-) collagen vascular disease, (-) fibromyalgia, (-) multiple sclerosis, (-) muscular dystrophy, (-) RSD,(-) joint pain (-)swelling, (-) joint pain,swelling.  Neurologic: (-) epilepsy, (-)seizures,(-) brain tumor,(-)  TIA, (-)stroke, (-)headaches, (-)Parkinson disease,(-) memory loss, (-) LOC. Cardiovascular: (-) Chest pain, (-) swelling in legs/feet, (-) SOB, (-) cramping in legs/feet with walking. Respiratory: (-) SOB, (-) Coughing, (-) night sweats. GI: (-) nausea, (-) vomiting, (-) diarrhea, (-) blood in stool, (-) gastric ulcer. Psychiatric: (-) Depression, (-) Anxiety, (-) bipolar disease, (-) Alzheimer's Disease  Allergic/Immunologic: (-) allergies latex, (-) allergies metal, (-) skin sensitivity. Hematlogic: (-) anemia, (-) blood transfusion, (-) DVT/PE, (-) Clotting disorders     Subjective:     Vital signs are stable. In general, patient is awake, alert and oriented X3, in no apparent distress. Examination of HENT reveals normocephalic, atraumatic. PERRLA/EOMI sclera are white. Conjunctivae are clear. TM's are intact. Pharynx is pink and moist.  Uvula and tongue are midline. Heart: Positive S1 and positive S2 with regular rate and rhythm. Lungs: Clear to auscultation bilaterally without rales, rhonchi or wheezes. Abdomen: soft, nontender. Positive bowel sounds. No organomegaly. No guarding or rigidity. Constitution:    BP (!) 115/59   Pulse 77   Temp 98.1 °F (36.7 °C) (Infrared)   Resp 16   SpO2 97%        Psycihatric:  The patient is alert and oriented x 3, appears to be stated age and in no distress. Respiratory:  Respiratory effort is not labored. Patient is not gasping. Palpation of the chest reveals no tactile fremitus. Skin:  Upon inspection: the skin appears warm, dry and intact. There is  a previous scar over the affected area. There is any cellulitis, lymphedema or cutaneous lesions noted in the lower extremities. Upon palpation there is no induration noted. Neurologic:  Gait: antalgic; Motor exam of the lower extremities show ; quadriceps, hamstrings, foot dorsi and plantar flexors intact R.  5/5 and L. 5/5.  Deep tendon reflexes are 2/4 at the knees and 2/4 at the ankles with strong extensor hallicus longus motor strength bilaterally. Sensory to both feet is intact to all sensory roots. Cardiovascular: The vascular exam is normal and is well perfused to distal extremities. Distal pulses DP/PT: R. 2+; L. 2+. There is cap refill noted less than two seconds in all digits. There is not edema of the bilateral lower extremities. There is not varicosities noted in the distal extremities. Lymph:  Upon palpation,  there is no lymphadenopathy noted in bilateral lower extremities. Musculoskeletal:  Gait: antalgic; examination of the nails and digits reveal no cyanosis or clubbing. Lumbar exam:  On visual inspection, there is not deformity of the spine. full range of motion, no tenderness, palpable spasm or pain on motion. Special tests: Straight Leg Raise negative, Gutierrez test negative. Hip exam:   Upon inspection, there is not deformity noted. Upon palpation there is not tenderness. ROM: is  full and symmetrical.   Strength: Hip Flexors 5/5; Hip Abductors 5/5; Hip Adduction 5/5. Knee exam:  Left knee exam shows;  range of motion of R. Knee is 0 to 120, and L. Knee is 5 to 120. The patient does have  pain on motion, effusion is mild, there is tenderness over the  medial region, there are not any masses, there is not ligamentous instability, there is  deformity noted. Knee exam: left positive for moderate crepitations, some mild tenderness laxity is not noted with  stress.   There is not a popliteal cyst.     R. Knee:  Lachman's negative, Anterior Drawer negative, Posterior Drawer negative  Prateek's negative, Thallasy  negative,   PF grind test negative, Apprehension test negative, Patellar J sign  negative  L. Knee:  Lachman's negative, Anterior Drawer negative, Posterior Drawer negative  Prateek's positive, Thallasy  positive,   PF grind test positive, Apprehension test negative,  Patellar J sign  negative     Xray Exam:  Severe medial joint narrowing  Radiographic findings reviewed with patient     Assessment:       Encounter Diagnoses   Name Primary? Primary osteoarthritis of left knee Yes         Plan:  Natural history and expected course discussed. Questions answered. Educational materials distributed. Rest, ice, compression, and elevation (RICE) therapy. Reduction in offending activity. I had a lengthy discussion with the patient regarding their diagnosis. I explained treatment options including surgical vs non surgical treatment. I reviewed in detail the risks and benefits and outlined the procedure in detail with expected outcomes and possible complications. I also discussed non surgical treatment such as injections (CSI and visco supplementation), physical therapy, topical creams and NSAID's. They have elected for surgical management at this time. We discussed various treatment options both surgical and non-surgical.   The patient is unable to ambulate more than 100 feet and is unable to perform the average daily activities including:  Light housework, ADLs, donning clothes, toileting and exercise. Patient has failed previous conservative measures including cortisone injections, NSAIDs, PT, HEP and pain medication and is currently a fall risk to the disability and decreased functioning. The patient wishes to have the total knee arthroplasty. The risks and benefits of a total knee replacement were discussed with the patient. The risks include but are not limited to: infection, injury to blood vessels and nerves, non relief of symptoms, arthrofibrosis of knee, aseptic loosening of prosthesis, intraoperative fracture, blood loss, PE/DVT, MI, dislocation of hip and knee, need for further operative intervention and death. The patient is aware of the risks and wished to proceed with a Left total knee replacement 2/20/23.       The patient was counseled at length about the risks of gia Covid-19 during their perioperative period and any recovery window from their procedure. The patient was made aware that gia Covid-19  may worsen their prognosis for recovering from their procedure  and lend to a higher morbidity and/or mortality risk. All material risks, benefits, and reasonable alternatives including postponing the procedure were discussed. The patient does wish to proceed with the procedure at this time.

## 2023-02-20 NOTE — CARE COORDINATION
2/20/2023: SS Note/Discharge planning:  SS Consult for post-op d/c planning and HHC/DME orders noted, d/c plan for pt to return home from [facility] with [facility] for home PT for start of care on Tuesday 2/21, [facility] to deliver w/w to [facility] today for pt to take home, [facility] notified. Electronically signed by GEETHA Azul on 2/20/2023 at 3:31 PM

## 2023-02-20 NOTE — ANESTHESIA POSTPROCEDURE EVALUATION
Department of Anesthesiology  Postprocedure Note    Patient: Renato Rapp  MRN: [id number]  YOB: 1945  Date of evaluation: 2/20/2023      Procedure Summary     Date: 02/20/23 Room / Location: [address] [place] [address]    Anesthesia Start: 3351 Anesthesia Stop: 0576    Procedure: LEFT KNEE TOTAL KNEE ARTHROPLASTY-2/20/23 [facility] AND NEPHEW (Left) Diagnosis:       Osteoarthritis of left knee      (Osteoarthritis of left knee [M17.12])    Surgeons: Belma Leyden, DO Responsible Provider: Marcus Salazar MD    Anesthesia Type: spinal ASA Status: 3          Anesthesia Type: No value filed.     Dulce Maria Phase I: Dulce Maria Score: 10    Dulce Maria Phase II: Dulce Maria Score: 10      Anesthesia Post Evaluation    Patient location during evaluation: PACU  Patient participation: complete - patient participated  Level of consciousness: awake  Airway patency: patent  Nausea & Vomiting: no nausea and no vomiting  Complications: no  Cardiovascular status: hemodynamically stable  Respiratory status: acceptable  Hydration status: euvolemic  Multimodal analgesia pain management approach

## 2023-02-20 NOTE — DISCHARGE INSTRUCTIONS
Your information:  Name: Roddy Gonzalez  : 1945    Your instructions:  [facility]  Dr. Erin Wilkerson Discharge Instructions   Weight bearing Status - Weight bearing as tolerated - on left lower Extremity  Pain medication Per Prescriptions  Contact Office for Medication Refill- [phone]  Office can refill pain med every 7 days  If patient discharging to facility then pain control will be continued per facility physician  Ice to operative/injured site for 15-30 minutes of each hour for next 5 days    Recommend that you continue to ice the area 2-3 times per day after this   Elevate operative/injured limb on 2 pillows at home  Goal is to have limb above the heart if able  Continue DVT Prophylaxis (blood clot prevention) as Prescribed: Aspirin 325 mg twice daily   Wound care - Can take off the dressing at the surgical site seven days after the date of surgery. Can just peel off. After, do daily dressing changes as needed until the drainage from the surgical site ceases    Follow Up in Office in 2 weeks. What to do after you leave the hospital:    Recommended diet: regular diet    Recommended activity: activity as tolerated    Call the office at [phone] or directions or with any questions. Watch for these significant complications. Call physician if they or any other problems occur:  Fever over 101°, redness, swelling or warmth at the operative site  Unrelieved nausea    Foul smelling or cloudy drainage at the operative site   Unrelieved pain    Blood soaked dressing.  (Some oozing may be normal)     Numb, pale, blue, cold or tingling extremity    The following personal items were collected during your admission and were returned to you:    Belongings  Dental Appliances: None  Vision - Corrective Lenses: Eyeglasses  Hearing Aid: None  Clothing: Undergarments, Footwear, Jacket/Coat, Pants, Shirt, Socks  Jewelry: None  Body Piercings Removed: N/A  Electronic Devices: None  Weapons (Notify Protective Services/Security): None  Home Medications: None  Valuables Given To: Family (Comment)  Responsible person(s) in the waiting room: rciardo  Patient approves for provider to speak to responsible person post operatively: Yes    Information obtained by:  By signing below, I understand that if any problems occur once I leave the hospital I am to contact Dr. Godfrey. I understand and acknowledge receipt of the instructions indicated above.

## 2023-02-20 NOTE — PROGRESS NOTES
[address] [facility]  [facility] [place]. [place]        Date:2023                                                  Patient Name: Lien Szymanski    MRN: [id number]    : 1945    Room: OR POOL/NONE      Evaluating OT: Dakota Peters OTR/L; [id number]     Referring Provider and Specific Provider Orders/Date:      23  OT eval and treat  Start:  23,   End:  23,   ONE TIME,   Standing Count:  1 Occurrences,   R         Keena Garcia,       Placement Recommendation: Home with no skilled occupational therapy needed after discharge from inpatient. Diagnosis:   1. Primary osteoarthritis of left knee    2.  Osteoarthritis of left knee         Surgery: L TKA      Pertinent Medical History:       Past Medical History:   Diagnosis Date    Breast cancer ([address] 2000    right/ DCIS    Cancer ([address]     right breast  had lumpectomy & radiation     GERD (gastroesophageal reflux disease)     History of therapeutic radiation     Hyperlipidemia     Hypertension     PONV (postoperative nausea and vomiting)     with anesthesia    Sleep apnea          Past Surgical History:   Procedure Laterality Date    BREAST BIOPSY Left     benign    BREAST BIOPSY Left     benign    BREAST BIOPSY Right     DCIS    BREAST LUMPECTOMY Right 2000    BREAST SURGERY Right 2000    lumpectomy    CHOLECYSTECTOMY      DILATION AND CURETTAGE OF UTERUS      FOOT SURGERY Right 2017    HYSTERECTOMY (CERVIX STATUS UNKNOWN)  1998    JOINT REPLACEMENT Right     knee    RACHID STEROTACTIC LOC BREAST BIOPSY RIGHT Right 2021    RACHID STEROTACTIC LOC BREAST BIOPSY RIGHT 2021 [facility]        Precautions:  Fall Risk, full weight bearing: L LE d/t TKA, [place]       Assessment of current deficits:     [x] Functional mobility  [x]ADLs  [x] Strength               []Cognition    [x] Functional transfers   [x] IADLs [] Safety Awareness   [x]Endurance    [] Fine Coordination              [x] Balance      [] Vision/perception   []Sensation     []Gross Motor Coordination  [x] ROM  [] Delirium                   [] Motor Control     OT PLAN OF CARE   OT POC based on physician orders, patient diagnosis and results of clinical assessment    Frequency/Duration 1-3 days/wk for 2 weeks PRN     Specific OT Treatment Interventions to include:   * Instruction/training on adapted ADL techniques and AE recommendations to increase functional independence within precautions       * Training on energy conservation strategies, correct breathing pattern and techniques to improve independence/tolerance for self-care routine  * Functional transfer/mobility training/DME recommendations for increased independence, safety, and fall prevention  * Patient/Family education to increase follow through with safety techniques and functional independence  * Recommendation of environmental modifications for increased safety with functional transfers/mobility and ADLs  * Therapeutic exercise to improve motor endurance, ROM, and functional strength for ADLs/functional transfers  * Therapeutic activities to facilitate/challenge dynamic balance, stand tolerance for increased safety and independence with ADLs  * Positioning to improve skin integrity, interaction with environment and functional independence    Recommended Adaptive Equipment: wheeled walker      Home Living: with spouse; single family home, 1 story, 2 steps to enter with no rail, walk in shower in full bathroom, half bath also. Equipment owned: cane, shower chair, grab bar in the shower    Prior Level of Function: Independent with ADLs , Independent with IADLs; ambulated with a cane d/t pain in L knee    Driving: no  Occupation: retired     Pain Level: 5/10 pain in L knee at rest, 9/10 pain in L knee with movement; Nursing notified.       Cognition: A&O: 4/4; Follows 2 step directions   Memory: good    Sequencing: good    Problem solving: good    Judgement/safety: good     [facility]   AM-PAC Daily Activity - Inpatient   How much help is needed for putting on and taking off regular lower body clothing?: A Lot  How much help is needed for bathing (which includes washing, rinsing, drying)?: A Lot  How much help is needed for toileting (which includes using toilet, bedpan, or urinal)?: A Little  How much help is needed for putting on and taking off regular upper body clothing?: A Little  How much help is needed for taking care of personal grooming?: A Little  How much help for eating meals?: None  AM-[facility] Inpatient Daily Activity Raw Score: 17  AM-PAC Inpatient ADL T-Scale Score : 37.26  ADL Inpatient CMS 0-100% Score: 50.11  ADL Inpatient CMS G-Code Modifier : CK     Functional Assessment:    Initial Eval Status  Date: 2/20/23 Treatment Status  Date: STGs = LTGs  Time frame: 10-14 days   Feeding Independent to bring cup to mouth  Independent    Grooming Supervision   Independent    UB Dressing Minimal Assist for gown management before sitting EOB. Minimal assist to [facility] gown at EOB. Supervision to don sweatshirt while seated EOB. Independent    LB Dressing Maximal assist to doff incontinent garment while seated EOB d/t urinary incontinence. Moderate Assist to thread feet through incontinent garment and pants while seated on bedside commode then stood with assistance to bring garment up around hips and waist.  Depending to doff socks and R compression stocking d/t urine. Supervision    Bathing Moderate assist to bathe R LE as pt was incontinent of urine upon standing from EOB. Modified [place]    Toileting Supervision for hygiene and to bathe pericare while seated on bedside commode d/t urinary incontinence    Independent    Bed Mobility  Supine to sit: Supervision   Sit to supine: Minimal Assist   Supine to sit:  Independent   Sit to supine: Independent    Functional Transfers Moderate Assist from EOB to wheeled walker x 2 reps. Moderate Assist for transfer to and from bedside commode. Transfer training with verbal cues for hand placement throughout session to improve safety. Independent    Functional Mobility Moderate Assist with wheeled walker to improve balance from EOB to bedside commode and from bedside commode to EOB + 3 side steps towards head of bed, verbal cues for walker sequence and safety. Modified [place]    Balance Sitting:     Static: good at EOB    Dynamic: fair plus on bedside commode  Standing: fair with wheeled walker, maximal verbal cues for L knee extension. Activity Tolerance Fair minus  good    Visual/  Perceptual Glasses: yes   Pt states she has cataracts and glaucoma                Hand Dominance: right      AROM (PROM) Strength Additional Info:    RUE  WFL 4/5 good  and wfl FMC/dexterity noted during ADL tasks     LUE WFL 4/5 good  and wfl FMC/dexterity noted during ADL tasks       Hearing: WFL   Sensation:  No c/o numbness or tingling  Tone: WFL   Edema: yes, surgical extremity     Comments: Upon arrival the patient was supine. At end of session, patient was supine with call light and phone within reach, all lines and tubes intact. Overall patient demonstrated decreased independence and safety during completion of ADL/functional transfer/mobility tasks. Pt would benefit from continued skilled OT to increase safety and independence with completion of ADL/IADL tasks for functional independence and quality of life.     Treatment: OT treatment provided this date includes:  Instruction/training on safety and adapted techniques for completion of ADLs  Instruction/training on safe functional mobility/transfer techniques  Instruction/training on energy conservation/work simplification for completion of ADL's  Instruction/training on proper positioning/alignment to prevent contractures   Instruction/training in weight bearing status and walker sequence  Instruction/training in use of adaptive equipment for lower body dressing, demo provided prior to use  Instruction/training in lower body dressing techniques   Instruction/training in car transfer technique and seat positioning  Instruction/training in Aquacel dressing and bathing    Rehab Potential: Good for established goals. Patient / Family Goal: return home      Patient and/or family were instructed on functional diagnosis, prognosis/goals and OT plan of care. Demonstrated good understanding. Eval Complexity: Low    Time In: 1:05pm  Time Out: 2:00pm   Total Treatment Time: 40      Min Units   OT Eval Low 97165  X  1    OT Eval Medium 04594      OT Eval High 07175      OT Re-Eval 64302            ADL/Self Care 30814  25  2    Therapeutic Activities 38448  15  1   Therapeutic Ex 56408       Orthotic Management 92784       Manual 93255     Neuro Re-Ed 05184       Non-Billable Time        Evaluation Time additionally includes thorough review of current medical information, gathering information on past medical history/social history and prior level of function, interpretation of standardized testing/informal observation of tasks, assessment of data and development of plan of care and goals.         Evaluating OT: Chris Escalante OTR/L; [id number]

## 2023-02-20 NOTE — ANESTHESIA PROCEDURE NOTES
Peripheral Block    Patient location during procedure: PACU  Reason for block: at surgeon's request  Start time: 2/20/2023 6:49 AM  End time: 2/20/2023 6:55 AM  Staffing  Performed: anesthesiologist   Anesthesiologist: Rahul Palumbo MD  Preanesthetic Checklist  Completed: patient identified, IV checked, site marked, risks and benefits discussed, surgical/procedural consents, equipment checked, pre-op evaluation, timeout performed, anesthesia consent given, oxygen available, monitors applied/VS acknowledged, fire risk safety assessment completed and verbalized and blood product R/B/A discussed and consented  Peripheral Block   Patient position: supine  Provider prep: mask and sterile gloves  Patient monitoring: cardiac monitor, continuous pulse ox, frequent blood pressure checks, IV access, oxygen and responsive to questions  Block type: Femoral  Adductor canal  Laterality: left  Injection technique: single-shot  Guidance: ultrasound guided  Local infiltration: lidocaine  Infiltration strength: 1 %  Local infiltration: lidocaine  Dose: 3 mL    Needle   Needle type: insulated echogenic nerve stimulator needle   Needle gauge: 20 G  Needle localization: ultrasound guidance  Needle length: 10 cm  Assessment   Injection assessment: negative aspiration for heme, no paresthesia on injection, local visualized surrounding nerve on ultrasound and no intravascular symptoms  Paresthesia pain: none  Slow fractionated injection: yes  Hemodynamics: stable  Real-time US image taken/store: yes  Outcomes: uncomplicated and patient tolerated procedure well    Medications Administered  ropivacaine (NAROPIN) injection 0.5% - Perineural, Hip Left   30 mL - 2/20/2023 6:49:00 AM

## 2023-02-20 NOTE — PROGRESS NOTES
2/20/23 1500 reviewed discharge instructions with pt and her  ricardo. Both verbalized understanding,signed in agreement and given copy.  Kmo rn

## 2023-02-20 NOTE — OP NOTE
Operative Note      Patient: Wally Levin  YOB: 1945  MRN: [id number]    Date of Procedure: 2/20/2023    Pre-Op Diagnosis: Osteoarthritis of left knee [M17.12]    Post-Op Diagnosis: Same       Procedure(s):  LEFT KNEE TOTAL KNEE ARTHROPLASTY-2/20/23 [facility]    Surgeon(s): Phong Dale DO    Assistant:   First Assistant: Nataly Jha  Resident: Roamna Grullon DO; Maury Barrios DO; Jess Blair DO    Anesthesia: Spinal    Estimated Blood Loss (mL): less than 327     Complications: None    Specimens:   ID Type Source Tests Collected by Time Destination   A : BONE LEFT KNEE  Bone Bone SURGICAL PATHOLOGY Phong Dale DO 2/20/2023 0802        Implants:  Implant Name Type Inv. Item Serial No.  Lot No. LRB No. Used Action   CEMENT BNE 20ML 40GM FULL DOSE PMMA W/O ANTIBIO M VISC - [id number]  CEMENT BNE 20ML 40GM FULL DOSE PMMA W/O ANTIBIO M VISC  [employer] [facility]-WD [id number] Left 1 Implanted   INSERT TIB SZ 3-4 [id number] XLPE KNEE POST STBL HI FLX LEGION - [id number]  INSERT TIB SZ 3-4 [id number] XLPE KNEE POST STBL HI FLX LEGION  [facility] AND [employer]- [id number] Left 1 Implanted   COMPONENT PAT [id number] THK7. 5MM KNEE POLY RND RESURF GEN II - [id number]  COMPONENT PAT CZE74ON THK7. 5MM KNEE POLY RND RESURF GEN II  [employer]- [id number] Left 1 Implanted   COMPONENT FEM SZ 4 JOYCE L KNEE OXINIUM POST STBL [place] LEGION - [id number]  COMPONENT FEM SZ 4 JOYCE L KNEE OXINIUM POST STBL BERLIN [facility] [employer]- [id number] Left 1 Implanted   BASEPLATE TIB SZ 3 [id number] ML68MM THK2. 3MM L KNEE TI ALLY NP - [id number]  BASEPLATE TIB SZ 3 [id number] ML68MM THK2. 3MM L KNEE TI ALLY NP  [employer] Bernard Nearing [id number] Left 1 Implanted       a  Drains: * No LDAs found *    Findings: as above    Detailed Description of Procedure:   below    Department of Orthopedic Surgery  Operative Report        Pre-operative Diagnosis:  Left Knee Osteoarthritis    Post-operative Diagnosis:  Left Knee Osteoarthritis    Procedure:  Left Knee Arthroplasty    Surgeon:  Placido Mata DO     Assistant(s):  as above    Anesthesia:  Spinal anesthesia    Estimated blood loss:  Less than 100 ml    Specimens:  as above    Implants:  as above    Complications:  none    Condition:  Stable    Brief Hospital Course:  Jessica Shane is a patient known to Placido Mata DO's practice with persistent complaints of Left knee pain. Knee pain has failed to be relieved by non-operative conservative measures, and has began affecting daily activities of living. After examination of the patient, review of the radiologic studies, and appropriate pre-operative risk assessment, Placido Mata DO recommended Left knee arthroplasty, which the patient was agreeable towards. Operative Course: The patient was seen and identified outside the operative suite, in which the operative site was marked as appropriate by patient, surgeon, staff, and anesthesia. The patient was then taken into the operative suite, transferred to the operative table with all bony prominences and neurovascular structures well padded and protected. A tourniquet was placed high on the proximal thigh of the operative extremity. The patient was sedated under the care of the anesthesia team. The operative site was prepped and draped in standard sterile fashion. The tourniquet was inflated to 300 mmHg. An anterior midline incision was made over the knee with full-thickness flaps reflected revealing the anterior joint capsule. Medial parapatellar arthrotomy was performed reflecting the patella laterally. Anterior fat pad and anterior horns of the lateral and medial meniscus were sharply excised. The knee was flexed up. Anterior cruciate ligament and posterior cruciate ligament were then excised. All osteophytes on the distal femur were removed with rongeur.  At this point, drilling of the intramedullary canal of the distal femur was then performed. Distal femoral cutting jig was then placed and pinned in appropriate position. An oscillating saw was used to make the distal femoral cut, discarding the pieces of cut femoral bone and sent for study. The posterior reference guide was then placed on the distal femur after the intramedullary guide and the distal femoral cutting guide were then removed. The posterior referencing guide was then pinned in appropriate position. Mehdi wing was used to confirm appropriate femoral trial size according to pre-operative templating. Posterior reference guide was then removed. An appropriate sized 4-in-1 cutting guide was applied to the distal femur. Oscillating saw was used to make the anterior, posterior, and chamfer cuts. The 4-in-1 cutting guide was then removed. Attention was turned to the tibia. Intramedullary drilling was then performed of the proximal tibia. The intramedullary guide with the proximal tibial cutting guide was then placed on the tibia. Proximal tibial cutting guide was then pinned in appropriate position. After pinning the proximal tibial cutting guide in appropriate position, oscillating saw was then used to make the proximal tibial cut. The guide was then removed. The proximal tibia that was cut was sharply excised and removed. At this time, all osteophytes on the proximal tibia were then removed with a rongeur. Tensiometer was then placed in extension and flexion, confirming appropriate ligamentous balancing. The box-cutting guide for the posterior-stabilized knee was then placed on the distal femur. The box was then cut in the distal femur without complication. Box-cutting guide was then removed. An appropriately sized trial tibial baseplate and a polyethylene component trial were then placed into the knee. The appropriately sized femur trial component was then placed. The knee was reduced and taken through a range of motion and found to be appropriately stable.  Half pins were then placed in the tibial base plate confirming position in preparation for keel punch. The patella was then prepared. The patella surface was free hand cut for the appropriate size implant. The patella holes were drilled and the patella was then trialed. There was good alignment and tracking of the patella. Distal femoral component was removed, trial poly was then removed. Keel punch was then performed of the proximal tibial. Tibial base plate was then removed. Copious irrigation was performed of the wound and final inspection for all interposed soft tissue and loose bodies was then performed as cement was being mixed. Copious irrigation was performed once again of the knee. Cement was placed on the proximal tibial component and the tibial component was then impacted into appropriate position with all excess extruded cement being removed with [place] elevator. A polyethylene component was then impacted into appropriate position and checked for stability, which it was stable. Cement was then placed on the distal femoral component. Distal femoral component was then impacted in appropriate position with all excess extruded cement being removed with a [place] elevator. The knee was extended, taken through a range of motion, and found to be appropriately stable. Final inspection for all excess extruded cement was then performed. [place] elevator removed all excess extruded cement. Copious irrigation was performed of the knee. The knee was then soaked with a bedadine solution soak for 3 minutes. The knee was then throughly irrigated with saline solution. Then 1 gram of vanomycin powder was placed within the wound. The capsule was  then closed with strata-fix closure. I then injected our TXA mixture into the knee joint. Copious irrigation was performed of this layer followed by, 2-0 Vicryl for the subcutaneous tissues, and skin staples was used to secure incision.  A sterile layered dressing was placed over the wound. Tourniquet was deflated. The patient was awakened from anesthesia, transferred to the hospital bed, and taken to the PACU in stable condition. Disposition: The patient was taken to PACU in stable condition. Once stable, the patient will be transferred to the floor. Orders have been provided to begin physical therapy, weight bear as tolerated Left lower extremity. Patient received a dose of antibiotics preoperatively. We will continue this for 24 hours postoperatively for infection prophylaxis. The patient will also be started on asa for DVT prophylaxis. We have consulted  and case management for discharge planning and consulted the PCP for medical management.       Electronically signed by Sybil Severance, DO on 2/20/2023 at 9:01 AM

## 2023-02-20 NOTE — PROGRESS NOTES
1030 Spoke to Dr. Catie Sharma, patient was complaining of being lightheaded. A fluid bolus was given of 500 ml. The patient's lightheadedness is now diminished. Per Dr. Connor Lozano to send the patient to [facility].

## 2023-02-20 NOTE — PROGRESS NOTES
Physical Therapy Initial Evaluation/Plan of Care    Room #:  OR POOL/NONE  Patient Name: Sri Hyatt  YOB: 1945  MRN: [id number]    Date of Service: 2/20/2023     Tentative placement recommendation: Home with Home Health Physical Therapy 5 days/week   Equipment recommendation: Patient has needed equipment       Evaluating Physical Therapist: Negar Hilario PT #[id number]     Specific Provider Orders/Date/Referring Provider :  02/20/23 0930    PT eval and treat  Start:  02/20/23 0930,   End:  02/20/23 0930,   ONE TIME,   Standing Count:  1 Occurrences,   R         Caitlyn Medeiros, DO     Admitting Diagnosis:   Osteoarthritis of left knee [M17.12]   Visit diagnosis:   Visit Diagnoses         Codes    Osteoarthritis of left knee     M17.12            Patient Active Problem List   Diagnosis    Hallux valgus (acquired), right foot    S/P total knee arthroplasty, right    Primary osteoarthritis of left knee        ASSESSMENT of Current Deficits  Safe for discharge home with family at a  min/supervision assist level. Patient will continue to need therapy to maximize function. All questions and concerns addressed. PHYSICAL THERAPY  PLAN OF CARE       Patient and or family understand(s) diagnosis, prognosis, and plan of care.        Prior Level of Function: Patient ambulated independently    Rehab Potential: good for baseline      Past medical history:   Past Medical History:   Diagnosis Date    Breast cancer ([facility] [address] 2000    right/ DCIS    Cancer ([facility] [address]     right breast  had lumpectomy & radiation     GERD (gastroesophageal reflux disease)     History of therapeutic radiation     Hyperlipidemia     Hypertension     PONV (postoperative nausea and vomiting)     with anesthesia    Sleep apnea      Past Surgical History:   Procedure Laterality Date    BREAST BIOPSY Left 1991    benign    BREAST BIOPSY Left 1994    benign    BREAST BIOPSY Right 2000    DCIS    BREAST LUMPECTOMY Right 2000    BREAST SURGERY Right 2000    lumpectomy    CHOLECYSTECTOMY      DILATION AND CURETTAGE OF UTERUS      FOOT SURGERY Right 09/27/2017    HYSTERECTOMY (CERVIX STATUS UNKNOWN)  1998    JOINT REPLACEMENT Right     knee    RACHID STEROTACTIC LOC BREAST BIOPSY RIGHT Right 12/8/2021    RACHID STEROTACTIC LOC BREAST BIOPSY RIGHT 12/8/2021 [facility]         SUBJECTIVE:    Precautions:  ambulate patient, falls ,left lower extremity FWB (full weight bearing) urinary incontinence    Social history: Patient lives with spouse in a ranch home  with 2 steps, without rail  to enter  Walk in shower  , grab bars     Equipment owned: Cane and Shower chair,       [address] [facility]   How much help is needed turning from your back to your side while in a flat bed without using bedrails?: A Little  How much help is needed moving from lying on your back to sitting on the side of a flat bed without using bedrails?: A Little  How much help is needed moving to and from a bed to a chair?: A Little  How much help is needed standing up from a chair using your arms?: A Little  How much help is needed walking in hospital room?: A Little  How much help is needed climbing 3-5 steps with a railing?: A Little  AM-PAC Inpatient Mobility Raw Score : 18  AM-PAC Inpatient T-Scale Score : 43.63  Mobility Inpatient CMS 0-100% Score: 46.58  Mobility Inpatient CMS G-Code Modifier : CK    Nursing cleared patient for PT evaluation. The admitting diagnosis and active problem list as listed above have been reviewed prior to the initiation of this evaluation. OBJECTIVE:   Initial Evaluation  Date: 2/20/2023   Was pt agreeable to Eval/treatment?  Yes   Pain Level  10/10   Left knee recently medicated   Bed Mobility  Rolling: Supervision     Supine to sit: Supervision     Sit to supine: Supervision     Scooting: Supervision      Transfers Sit to stand: Supervision  Cues for hand placement and safety    Ambulation     2 x 70 feet  2 x 20 feet using  wheeled walker with Minimal progressing to supervision   for walker control and balance and cues for sequencing, left knee extension in stance phase of gait, safety, and keeping walker on ground versus lifting   Stair negotiation: ascended and descended   2 x 5 steps bilateral rail with minimal assist progressing to supervision     ROM Within functional limits except Left knee 5-75°    Strength Within functional limits  except  Left lower extremity 3/5   Balance Sitting EOB:  good -  Dynamic Standing:  fair + wheeled walker      Patient is Alert & Oriented x person, place, time, and situation and follows directions    Sensation:  Patient denies numbness/tingling  Edema:  yes left lower extremity   Vitals:  Blood Pressure at rest   Blood Pressure during session     Heart Rate at rest 72 Heart Rate during session 87   SPO2 at rest 94%  SPO2 during session 98%     Patient education  Patient educated on role of Physical Therapy, risks of immobility, safety and plan of care,  importance of mobility while in hospital , ankle pumps, quad set and glut set for edema control, blood clot prevention, stair training , weight bearing status , and left knee extension in bed and during stance phase of gait      Patient response to education:   Pt verbalized understanding Pt demonstrated skill Pt requires further education in this area   Yes Partial Yes       Treatment:  Patient practiced and was instructed in the following treatment:     Therapist educated and facilitated patient on techniques to increase safety and independence with bed mobility, balance, functional transfers, and functional mobility. Patient performed ankle pumps, quad sets, glut sets x 20 each. Active assist heelslide, hip abduction/adduction, straight leg raise x 15-20 each  Assisted to edge of bed,  Sat edge of bed 5 minutes with Supervision  to increase dynamic sitting balance and activity tolerance. . Ambulated in hallway, seated rest, performed stairs. Ambulated back to room, seated rest due to fatigue. Returned to room, assisted back to bed. Discussed with spouse home exercises, left knee extension in stance phase of gait and with stairs, walking sequencing as well as stair progression as patient with difficulty recalling information. Instruction knee extension in bed with kneecap and toes pointing to ceiling  Instruction and performance of incentive inspirometer. At end of session, patient in bed with spouse present call light and phone within reach,   all lines and tubes intact, nursing notified. Patient would benefit from skilled Home Physical Therapy to improve functional independence and quality of life. Patient's/ family goals   home today    Patient and or family understand(s) diagnosis, prognosis, and plan of care. Time in  1402  Time out  1449    Total Treatment Time  27 minutes    Evaluation time includes thorough review of current medical information, gathering information on past medical history/social history and prior level of function, completion of standardized testing/informal observation of tasks, assessment of data, and development of Plan of care and goals.      CPT codes:  Low Complexity PT evaluation (11718)  Therapeutic activities (41808)   15 minutes  1 unit(s)  Therapeutic exercises (11809)   12 minutes  1 unit(s)    Alba Hi PT

## 2023-02-20 NOTE — PROGRESS NOTES
640 to pacu for left adductor canal block by dr Valdez Ball. Connected to all monitors and O2 applied at 3 liters nasal canula. 648 time out performed and premedicated per orders. .  652 block started by dr Valdez Ball using ultrasound  655 30 ml 0.5% ropivacaine injected to left adductor canal katja well  700  updated

## 2023-02-20 NOTE — PROGRESS NOTES
0945 H&H drawn from [facility] and sent to laboratory. The patient tolerated the procedure well.

## 2023-02-21 NOTE — PROGRESS NOTES
CLINICAL PHARMACY NOTE: MEDS TO BEDS    Total # of Prescriptions Filled: 3   The following medications were delivered to the patient:  Oxycodone acetaminophen 5-325  Celecoxib 100 mg  Gabapentin 100 mg    Additional Documentation:

## 2023-02-27 ENCOUNTER — TELEPHONE (OUTPATIENT)
Dept: ORTHOPEDIC SURGERY | Age: 78
End: 2023-02-27

## 2023-02-27 DIAGNOSIS — M17.12 PRIMARY OSTEOARTHRITIS OF LEFT KNEE: Primary | ICD-10-CM

## 2023-02-27 RX ORDER — HYDROCODONE BITARTRATE AND ACETAMINOPHEN 5; 325 MG/1; MG/1
1 TABLET ORAL EVERY 6 HOURS PRN
Qty: 28 TABLET | Refills: 0 | Status: SHIPPED | OUTPATIENT
Start: 2023-02-27 | End: 2023-03-06

## 2023-02-27 NOTE — TELEPHONE ENCOUNTER
Dr Chica Machuca patient    Last appointment Visit date not found  Next appointment   Future Appointments   Date Time Provider Rachel Kruse   3/6/2023 10:20 AM Darcy Rivera, DO [address]      Last refill 2/20/23  DOS:  2/20/23      Patient called in requesting refill of     oxyCODONE-acetaminophen (PERCOCET) 5-325 MG per tablet [[id number]]    [address] - [facility]), [place] - [address] [facility] Thomas Dugan [phone] Donya Ambriz [phone]   Bryce [place]) [address]   Phone:  [phone]  Fax:  [phone]

## 2023-03-02 DIAGNOSIS — Z96.652 STATUS POST LEFT KNEE REPLACEMENT: Primary | ICD-10-CM

## 2023-03-03 ENCOUNTER — APPOINTMENT (OUTPATIENT)
Dept: ULTRASOUND IMAGING | Age: 78
End: 2023-03-03
Payer: MEDICARE

## 2023-03-03 ENCOUNTER — HOSPITAL ENCOUNTER (EMERGENCY)
Age: 78
Discharge: HOME | End: 2023-03-03
Payer: MEDICARE

## 2023-03-03 ENCOUNTER — TELEPHONE (OUTPATIENT)
Dept: ORTHOPEDIC SURGERY | Age: 78
End: 2023-03-03

## 2023-03-03 VITALS
HEART RATE: 62 BPM | WEIGHT: 139 LBS | OXYGEN SATURATION: 100 % | RESPIRATION RATE: 18 BRPM | SYSTOLIC BLOOD PRESSURE: 170 MMHG | DIASTOLIC BLOOD PRESSURE: 74 MMHG | BODY MASS INDEX: 26.26 KG/M2 | TEMPERATURE: 98.6 F

## 2023-03-03 DIAGNOSIS — R60.0 LEG EDEMA: Primary | ICD-10-CM

## 2023-03-03 DIAGNOSIS — G89.18 POSTOPERATIVE PAIN: ICD-10-CM

## 2023-03-03 LAB
ANION GAP SERPL CALCULATED.3IONS-SCNC: 6 MMOL/L (ref 7–16)
BASOPHILS ABSOLUTE: 0.05 E9/L (ref 0–0.2)
BASOPHILS RELATIVE PERCENT: 0.5 % (ref 0–2)
BUN BLDV-MCNC: 16 MG/DL (ref 6–23)
CALCIUM SERPL-MCNC: 9.6 MG/DL (ref 8.6–10.2)
CHLORIDE BLD-SCNC: 101 MMOL/L (ref 98–107)
CO2: 30 MMOL/L (ref 22–29)
CREAT SERPL-MCNC: 0.7 MG/DL (ref 0.5–1)
EOSINOPHILS ABSOLUTE: 0.21 E9/L (ref 0.05–0.5)
EOSINOPHILS RELATIVE PERCENT: 2.2 % (ref 0–6)
GFR SERPL CREATININE-BSD FRML MDRD: >60 ML/MIN/1.73
GLUCOSE BLD-MCNC: 103 MG/DL (ref 74–99)
HCT VFR BLD CALC: 35.3 % (ref 34–48)
HEMOGLOBIN: 11.2 G/DL (ref 11.5–15.5)
IMMATURE GRANULOCYTES #: 0.06 E9/L
IMMATURE GRANULOCYTES %: 0.6 % (ref 0–5)
LYMPHOCYTES ABSOLUTE: 2.91 E9/L (ref 1.5–4)
LYMPHOCYTES RELATIVE PERCENT: 30.9 % (ref 20–42)
MCH RBC QN AUTO: 30.9 PG (ref 26–35)
MCHC RBC AUTO-ENTMCNC: 31.7 % (ref 32–34.5)
MCV RBC AUTO: 97.2 FL (ref 80–99.9)
MONOCYTES ABSOLUTE: 0.63 E9/L (ref 0.1–0.95)
MONOCYTES RELATIVE PERCENT: 6.7 % (ref 2–12)
NEUTROPHILS ABSOLUTE: 5.55 E9/L (ref 1.8–7.3)
NEUTROPHILS RELATIVE PERCENT: 59.1 % (ref 43–80)
PDW BLD-RTO: 13.2 FL (ref 11.5–15)
PLATELET # BLD: 258 E9/L (ref 130–450)
PMV BLD AUTO: 9.9 FL (ref 7–12)
POTASSIUM REFLEX MAGNESIUM: 4 MMOL/L (ref 3.5–5)
RBC # BLD: 3.63 E12/L (ref 3.5–5.5)
SODIUM BLD-SCNC: 137 MMOL/L (ref 132–146)
WBC # BLD: 9.4 E9/L (ref 4.5–11.5)

## 2023-03-03 PROCEDURE — 6370000000 HC RX 637 (ALT 250 FOR IP): Performed by: PHYSICIAN ASSISTANT

## 2023-03-03 PROCEDURE — 80048 BASIC METABOLIC PNL TOTAL CA: CPT

## 2023-03-03 PROCEDURE — 85025 COMPLETE CBC W/AUTO DIFF WBC: CPT

## 2023-03-03 PROCEDURE — 93971 EXTREMITY STUDY: CPT

## 2023-03-03 PROCEDURE — 99284 EMERGENCY DEPT VISIT MOD MDM: CPT

## 2023-03-03 PROCEDURE — 36415 COLL VENOUS BLD VENIPUNCTURE: CPT

## 2023-03-03 RX ORDER — HYDROCODONE BITARTRATE AND ACETAMINOPHEN 5; 325 MG/1; MG/1
1 TABLET ORAL ONCE
Status: COMPLETED | OUTPATIENT
Start: 2023-03-03 | End: 2023-03-03

## 2023-03-03 RX ADMIN — HYDROCODONE BITARTRATE AND ACETAMINOPHEN 1 TABLET: 5; 325 TABLET ORAL at 10:26

## 2023-03-03 ASSESSMENT — PAIN SCALES - GENERAL: PAINLEVEL_OUTOF10: 7

## 2023-03-03 ASSESSMENT — PAIN DESCRIPTION - ORIENTATION: ORIENTATION: LEFT

## 2023-03-03 ASSESSMENT — PAIN DESCRIPTION - LOCATION: LOCATION: LEG

## 2023-03-03 NOTE — TELEPHONE ENCOUNTER
Patients  called in stating her pain has increased significantly since yesterday. Her foot is very swollen and she is having calf pain and tightness. I advised the patient and her  to go to the ER at this time to check for a DVT and pain control.

## 2023-03-03 NOTE — ED PROVIDER NOTES
Independent KYARA Visit. HPI:  3/3/23, Time: 9:26 AM BLANCHE Palma is a [de-identified] y.o. female presenting to the ED for left leg pains, beginning last evening  ago. The complaint has been persistent, moderate in severity, and worsened by movement of left leg . Patient comes in with complaint of left lower leg pain and swelling. Patient underwent left knee replacement by Dr. Maco Cruz on 2/20/2023. She states last night started with pain to the leg with increasing swelling. She denies any drainage or erythema of the wound. Denies any fever or chills. She denies any recent injury. Patient denies any chest pain or shortness of breath. Patient is presently on aspirin. Review of Systems:   A complete review of systems was performed and pertinent positives and negatives are stated within HPI, all other systems reviewed and are negative.          --------------------------------------------- PAST HISTORY ---------------------------------------------  Past Medical History:  has a past medical history of Breast cancer ([facility] [address] [facility] GERD (gastroesophageal reflux disease), History of therapeutic radiation, Hyperlipidemia, Hypertension, PONV (postoperative nausea and vomiting), and Sleep apnea. Past Surgical History:  has a past surgical history that includes joint replacement (Right); Cholecystectomy; Breast surgery (Right, 2000); Dilation and curettage of uterus; Foot surgery (Right, 09/27/2017); Breast lumpectomy (Right, 2000); Breast biopsy (Left, 1991); Breast biopsy (Left, 1994); Breast biopsy (Right, 2000); Hysterectomy (1998); [facility] STEREO BREAST BX W LOC DEVICE 1ST LESION RIGHT (Right, 12/8/2021); and Total knee arthroplasty (Left, 2/20/2023). Social History:  reports that she has never smoked. She has never used smokeless tobacco. She reports that she does not drink alcohol and does not use drugs. Family History: family history includes Breast Cancer in her maternal aunt;  Rectal Cancer in her mother. The patients home medications have been reviewed. Allergies: Patient has no known allergies.     -------------------------------------------------- RESULTS -------------------------------------------------  All laboratory and radiology results have been personally reviewed by myself   LABS:  Results for orders placed or performed during the hospital encounter of 03/03/23   CBC with Auto Differential   Result Value Ref Range    WBC 9.4 4.5 - 11.5 E9/L    RBC 3.63 3.50 - 5.50 E12/L    Hemoglobin 11.2 (L) 11.5 - 15.5 g/dL    Hematocrit 35.3 34.0 - 48.0 %    MCV 97.2 80.0 - 99.9 fL    MCH 30.9 26.0 - 35.0 pg    MCHC 31.7 (L) 32.0 - 34.5 %    RDW 13.2 11.5 - 15.0 fL    Platelets 632 241 - 392 E9/L    MPV 9.9 7.0 - 12.0 fL    Neutrophils % 59.1 43.0 - 80.0 %    Immature Granulocytes % 0.6 0.0 - 5.0 %    Lymphocytes % 30.9 20.0 - 42.0 %    Monocytes % 6.7 2.0 - 12.0 %    Eosinophils % 2.2 0.0 - 6.0 %    Basophils % 0.5 0.0 - 2.0 %    Neutrophils Absolute 5.55 1.80 - 7.30 E9/L    Immature Granulocytes # 0.06 E9/L    Lymphocytes Absolute 2.91 1.50 - 4.00 E9/L    Monocytes Absolute 0.63 0.10 - 0.95 E9/L    Eosinophils Absolute 0.21 0.05 - 0.50 E9/L    Basophils Absolute 0.05 0.00 - 0.20 O6/W   Basic Metabolic Panel w/ Reflex to MG   Result Value Ref Range    Sodium 137 132 - 146 mmol/L    Potassium reflex Magnesium 4.0 3.5 - 5.0 mmol/L    Chloride 101 98 - 107 mmol/L    CO2 30 (H) 22 - 29 mmol/L    Anion Gap 6 (L) 7 - 16 mmol/L    Glucose 103 (H) 74 - 99 mg/dL    BUN 16 6 - 23 mg/dL    Creatinine 0.7 0.5 - 1.0 mg/dL    Est, Glom Filt Rate >60 >=60 mL/min/1.73    Calcium 9.6 8.6 - 10.2 mg/dL       RADIOLOGY:  Interpreted by Radiologist.  US DUP LOWER EXTREMITY LEFT MILES   Final Result   No evidence of DVT in the left lower extremity.             ------------------------- NURSING NOTES AND VITALS REVIEWED ---------------------------   The nursing notes within the ED encounter and vital signs as below have been reviewed. BP (!) 170/74   Pulse 62   Temp 98.6 °F (37 °C)   Resp 18   Wt 139 lb (63 kg)   SpO2 100%   BMI 26.26 kg/m²   Oxygen Saturation Interpretation: Normal      ---------------------------------------------------PHYSICAL EXAM--------------------------------------      Constitutional/General: Alert and oriented x3, well appearing, non toxic in NAD  Head: Normocephalic and atraumatic  Eyes: PERRL, EOMI  Mouth: Oropharynx clear, handling secretions, no trismus  Neck: Supple, full ROM,   Pulmonary: Lungs clear to auscultation bilaterally, no wheezes, rales, or rhonchi. Not in respiratory distress  Cardiovascular:  Regular rate and rhythm, no murmurs, gallops, or rubs. 2+ distal pulses  Abdomen: Soft, non tender, non distended,   Extremities: Moves all extremities x 4. Warm and well perfused left knee with post incision with no surrounding erythema or drainage. Patient with swelling of the lower leg with ecchymosis present of the lower leg dorsal aspect of the foot. Pulses are normal normal sensation. Skin: warm and dry without rash  Neurologic: GCS 15,  Psych: Normal Affect      ------------------------------ ED COURSE/MEDICAL DECISION MAKING----------------------  Medications   HYDROcodone-acetaminophen (NORCO) 5-325 MG per tablet 1 tablet (1 tablet Oral Given 3/3/23 1026)         ED COURSE:     Medical Decision Making  Mignon Fofana is a [de-identified] y.o. female presenting to the ED for left leg pains, beginning last evening  ago. The complaint has been persistent, moderate in severity, and worsened by movement of left leg . Patient comes in with complaint of left lower leg pain and swelling. Patient underwent left knee replacement by Dr. Pratibha Farmer on 2/20/2023. She states last night started with pain to the leg with increasing swelling. She denies any drainage or erythema of the wound. Denies any fever or chills. She denies any recent injury. Patient denies any chest pain or shortness of breath. Patient is presently on aspirin. Ultrasound no DVT present patient with no sign of septic joint will be discharged to home to continue with Celebrex and Norco as previously        Amount and/or Complexity of Data Reviewed  Independent Historian: spouse  External Data Reviewed: radiology and notes. Labs: ordered. Decision-making details documented in ED Course. Radiology: ordered and independent interpretation performed. Decision-making details documented in ED Course. ECG/medicine tests: ordered and independent interpretation performed. Decision-making details documented in ED Course. Risk  Prescription drug management. Medical Decision Making    Patient presents to the ER for left leg pain . Social Determinants include   Social Connections: Not on file    Social Determinants : None. 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 Physical exam Constitutional/General: Alert and oriented x3, well appearing, non toxic in NAD  Head: Normocephalic and atraumatic  Eyes: PERRL, EOMI  Mouth: Oropharynx clear, handling secretions, no trismus  Neck: Supple, full ROM,   Pulmonary: Lungs clear to auscultation bilaterally, no wheezes, rales, or rhonchi. Not in respiratory distress  Cardiovascular:  Regular rate and rhythm, no murmurs, gallops, or rubs. 2+ distal pulses  Abdomen: Soft, non tender, non distended,   Extremities: Moves all extremities x 4. Warm and well perfused left knee with post incision with no surrounding erythema or drainage. Patient with swelling of the lower leg with ecchymosis present of the lower leg dorsal aspect of the foot. Pulses are normal normal sensation.   Skin: warm and dry without rash  Neurologic: GCS 15,  Psych: Normal Affect. Vital signs /60Temp   98.6 °F    (37 °C)  Heart Rate 85Resp 18SpO2 100%Wt   139 lb    (63 kg)  Ht   5' 1\"    (154.9 cm)  BMI 26.26 kg/m²Pain Level --. Differential diagnoses include but not limited to DVT, arterial occlusion, peripheral edema. Diagnostic studies revealed ultrasound no DVT present. No leukocytosis normal kidney function no electrolyte imbalance. Consults included none. Results were discussed with patient and . Patient was given Norco 5/325 mg p.o. times for their symptoms with moderate improvement. Patient will be discharged home to continue with Celebrex and Norco as previously prescribed discussed appropriate use and potential side effects of starting the prescribed medications. Patient continues to be non-toxic on re-evaluation. Findings were discussed with the patient and reasons to immediately return to the ED were articulated to them. They will follow-up with their PMD and orthopedic. Discharge Instructions:   Patient referred to  [facility]  [phone]    Call in 3 days      [facility], [address] [place] [address]  [phone]    Call today      MEDICATIONS:   DISCHARGE MEDICATIONS:  Discharge Medication List as of 3/3/2023 12:13 PM          DISCONTINUED MEDICATIONS:  Discharge Medication List as of 3/3/2023 12:13 PM          Record Review:  Records Reviewed : Source recent [facility] encounters       Disposition Considerations: This patient's ED course included: a personal history and physicial examination and re-evaluation prior to disposition  This patient has remained hemodynamically stable during their ED course. I emphasized the importance of follow-up with the physician I referred them to in the timeframe recommended. I discussed with the patient emergent symptoms and the need to immediately return to the ER.  Written information was included in their discharge instructions. Additional verbal discharge instructions were also given and discussed with the patient to supplement those generated by the EMR. We also discussed medications that were prescribed  (if any) including common side effects and interactions. The patient was advised to abstain from driving, operating heavy machinery or making significant decisions while taking medications such as opiates and muscle relaxers that may impair this. All questions were addressed. They understand return precautions and discharge instructions. The patient  expressed understanding. Vitals were stable and they were in no distress at discharge. Counseling: The emergency provider has spoken with the patient and discussed todays results, in addition to providing specific details for the plan of care and counseling regarding the diagnosis and prognosis. Questions are answered at this time and they are agreeable with the plan.      --------------------------------- IMPRESSION AND DISPOSITION ---------------------------------    IMPRESSION  1. Leg edema    2. Postoperative pain        DISPOSITION  Disposition: Discharge to home  Patient condition is good      NOTE: This report was transcribed using voice recognition software.  Every effort was made to ensure accuracy; however, inadvertent computerized transcription errors may be present      Louie Phillips  03/03/23 3603

## 2023-03-06 ENCOUNTER — OFFICE VISIT (OUTPATIENT)
Dept: ORTHOPEDIC SURGERY | Age: 78
End: 2023-03-06

## 2023-03-06 VITALS — HEIGHT: 61 IN | BODY MASS INDEX: 27.38 KG/M2 | TEMPERATURE: 98 F | WEIGHT: 145 LBS

## 2023-03-06 DIAGNOSIS — M17.12 PRIMARY OSTEOARTHRITIS OF LEFT KNEE: ICD-10-CM

## 2023-03-06 DIAGNOSIS — Z96.652 HISTORY OF LEFT KNEE REPLACEMENT: ICD-10-CM

## 2023-03-06 DIAGNOSIS — Z96.651 S/P TOTAL KNEE ARTHROPLASTY, RIGHT: Primary | ICD-10-CM

## 2023-03-06 PROCEDURE — 99024 POSTOP FOLLOW-UP VISIT: CPT | Performed by: ORTHOPAEDIC SURGERY

## 2023-03-06 RX ORDER — METHYLPREDNISOLONE 4 MG/1
TABLET ORAL
Qty: 1 KIT | Refills: 1 | Status: SHIPPED | OUTPATIENT
Start: 2023-03-06 | End: 2023-03-12

## 2023-03-06 RX ORDER — HYDROCODONE BITARTRATE AND ACETAMINOPHEN 5; 325 MG/1; MG/1
1 TABLET ORAL EVERY 6 HOURS PRN
Qty: 28 TABLET | Refills: 0 | Status: SHIPPED | OUTPATIENT
Start: 2023-03-06 | End: 2023-03-13

## 2023-03-06 NOTE — PROGRESS NOTES
Post-Operative week: 2 Status Post right Total Knee Arthroplasty, surgery date 2/20/2023  Systemic or Specific Complaints:No Complaints    Objective:     General: alert, appears stated age, and cooperative   Wound: Wound clean and dry no evidence of infection. , No Erythema, No Edema, No Drainage, and Wound Intact   Motion: Flexion: 95 to 5 Degrees   DVT Exam: No evidence of DVT seen on physical exam.  Negative Alex's sign. No cords or calf tenderness. No significant calf/ankle edema. Xrays:  No signs of fracture, there is good alignment, and no signs of aseptic loosening. Radiographic findings reviewed with patient    Assessment:     Encounter Diagnosis   Name Primary? Primary osteoarthritis of left knee       Doing well postoperatively. Plan:     Continues current post-op course  Continues current post-op course, staples were removed at today's appt  Patient is to continue taking enteric coated aspirin 81 mg, 1 tablet twice daily. Patient is to continue wearing RAHEL hose until next follow up visit but wear during the day and remove at night. Outpatient physical therapy is to begin immediately. No bathing/swimming/hot tub for two weeks or until incision is completely closed. We will see the patient back in 4 weeks for repeat xray and evaluation.    [facility] [address]28  Medrol for back pain

## 2023-03-08 ENCOUNTER — EVALUATION (OUTPATIENT)
Dept: PHYSICAL THERAPY | Age: 78
End: 2023-03-08
Payer: MEDICARE

## 2023-03-08 DIAGNOSIS — M17.12 PRIMARY OSTEOARTHRITIS OF LEFT KNEE: Primary | ICD-10-CM

## 2023-03-08 PROCEDURE — 97163 PT EVAL HIGH COMPLEX 45 MIN: CPT | Performed by: PHYSICAL THERAPIST

## 2023-03-08 NOTE — PROGRESS NOTES
Physical Therapy Daily Treatment Note    Date: 3/8/2023  Patient Name: Qian Jackson  : 1945   MRN: [id number]  DOInjury: -  DOSx: 2023  Referring Provider: Rosa Maria Infante DO  [address]  [facility]     Medical Diagnosis:      Diagnosis Orders   1. Primary osteoarthritis of left knee            Outcome Measure:  LEFS 72% impairment        X = TO BE PERFORMED NEXT VISIT  > = PROGRESS TO THIS    S: See eval  O: Discussed anatomy, physiology, body mechanics, principles of loading, and progressive loading/activity. Time  9674-7537       Visit  1 Repeat outcome measure at mid point and end. Pain    Pain at rest 10/10     ROM  96/-16     Modalities          MO   Manual      Stretching knee flexion   MT   Stretching       Patella mobs   TE   Prone hangs   TE   Heel props   TE   Knee flex stretch-seated   TE   Prone self flexion stretch   TE   Exercise       Nustep  x  TE   SAQ x  TE   Heel slides x  TE   SLR x  TE   LAQ x  TE   Marching   TA   Squat    TA   Step-ups - FWD    TA   Step-ups - LAT   TA   Step-ups - BWD    TA   Step up and over reciprocally    TA   [] TG  [] Leg Press 2-leg x  TE   [] TG  [] Leg Press 1-leg   TE   CR    TE   Knee Extension Machine   TE   Marching gait   NR   Side stepping   NR               A: Tolerated well.     P: Continue with rehab plan  Lluvia Duque PT    Treatment Charges: Mins Units   Initial Evaluation 45 1   Re-Evaluation     Ther Exercise         TE     Manual Therapy     MT     Ther Activities        TA     Gait Training          GT     Neuro Re-education NR     Modalities     Non-Billable Service Time     Other     Total Time/Units 45 1

## 2023-03-08 NOTE — PROGRESS NOTES
[facility] OUTPATIENT REHABILITATION  PHYSICAL THERAPY INITIAL EVALUATION         Date:  3/8/2023   Patient: Milton Root  : 1945  MRN: [id number]  Referring Provider: Florence Stock DO   [address]  [facility] Diagnosis:     F61.198 (ICD-10-CM) - History of left knee replacement    Physician Order: Eval and Treat     SUBJECTIVE:     Surgical procedure: L TKA    Date of surgery: 2023    Services provided following surgery: home care    History: TKA due to DJD. Chief complaint: pain    Pain:   Current: 10/10       Symptom Type / Quality: aching  Location[de-identified] Knee: anterior     Imaging results: XR CHEST (2 VW)    Result Date: 2023  EXAMINATION: TWO XRAY VIEWS OF THE CHEST 2023 11:06 am COMPARISON: None. HISTORY: ORDERING SYSTEM PROVIDED HISTORY: Primary osteoarthritis of left knee TECHNOLOGIST PROVIDED HISTORY: Pre Op Reason for exam:->Pre OP FINDINGS: The lungs are without acute focal process. There is no effusion or pneumothorax. The cardiomediastinal silhouette is without acute process. The osseous structures are without acute process. No acute process. XR KNEE LEFT (1-2 VIEWS)    Result Date: 3/6/2023  EXAMINATION: TWO XRAY VIEWS OF THE LEFT KNEE 3/6/2023 8:43 am COMPARISON: 2023 HISTORY: ORDERING SYSTEM PROVIDED HISTORY: Status post left knee replacement FINDINGS: Anatomically aligned left TKA with moderate knee joint effusion and no abnormal osseous findings. Left TKA with moderate knee joint effusion. XR KNEE LEFT (1-2 VIEWS)    Result Date: 2023  EXAMINATION: TWO XRAY VIEWS OF THE LEFT KNEE 2023 10:08 am COMPARISON: The previous study performed 2023. HISTORY: ORDERING SYSTEM PROVIDED HISTORY: Post Op TECHNOLOGIST PROVIDED HISTORY: Of operative side while in recovery room. Reason for exam:->Post Op FINDINGS: Patient is status post total left knee arthroplasty.   The bones and orthopedic hardware are in good alignment and position. No abnormal radiolucencies are identified at the bone/orthopedic hardware interfaces. No new osseous abnormality is seen. Overlying surgical staples are identified along the ventral aspects of the knee. Soft tissue swelling and air are identified in the operative field, secondary to the surgery. Patient status post total left knee arthroplasty. US DUP LOWER EXTREMITY LEFT MILES    Result Date: 3/3/2023  EXAMINATION: DUPLEX VENOUS ULTRASOUND OF THE LEFT LOWER EXTREMITY 3/3/2023 11:01 am TECHNIQUE: Duplex ultrasound using B-mode/gray scaled imaging and Doppler spectral analysis and color flow was obtained of the deep venous structures of the left lower extremity. COMPARISON: None. HISTORY: ORDERING SYSTEM PROVIDED HISTORY: pain swelling TECHNOLOGIST PROVIDED HISTORY: Reason for exam:->pain swelling What reading provider will be dictating this exam?->CRC FINDINGS: The visualized veins of the left lower extremity are patent and free of echogenic thrombus. The veins demonstrate good compressibility with normal color flow study and spectral analysis. No evidence of DVT in the left lower extremity.        Past Medical History:  Past Medical History:   Diagnosis Date    Breast cancer ([address] 2000    right/ DCIS    Cancer ([address]     right breast  had lumpectomy & radiation     GERD (gastroesophageal reflux disease)     History of therapeutic radiation     Hyperlipidemia     Hypertension     PONV (postoperative nausea and vomiting)     with anesthesia    Sleep apnea      Past Surgical History:   Procedure Laterality Date    BREAST BIOPSY Left 1991    benign    BREAST BIOPSY Left 1994    benign    BREAST BIOPSY Right 2000    DCIS    BREAST LUMPECTOMY Right 2000    BREAST SURGERY Right 2000    lumpectomy    CHOLECYSTECTOMY      DILATION AND CURETTAGE OF UTERUS      FOOT SURGERY Right 09/27/2017    HYSTERECTOMY (CERVIX STATUS UNKNOWN)  1998    JOINT REPLACEMENT Right knee    RACHID STEROTACTIC LOC BREAST BIOPSY RIGHT Right 12/8/2021    RACHID STEROTACTIC LOC BREAST BIOPSY RIGHT 12/8/2021 [facility]    TOTAL KNEE ARTHROPLASTY Left 2/20/2023    LEFT KNEE TOTAL KNEE ARTHROPLASTY-2/20/23 [facility] AND NEPHEW performed by Arnaldo Stephens DO at [address]       Medications:   Current Outpatient Medications   Medication Sig Dispense Refill    HYDROcodone-acetaminophen (NORCO) 5-325 MG per tablet Take 1 tablet by mouth every 6 hours as needed for Pain for up to 7 days. Intended supply: 7 days. Take lowest dose possible to manage pain Max Daily Amount: 4 tablets 28 tablet 0    methylPREDNISolone (MEDROL, TASH,) 4 MG tablet Take by mouth. 1 kit 1    aspirin 325 MG EC tablet Take 1 tablet by mouth in the morning and at bedtime 30 tablet 0    celecoxib (CELEBREX) 100 MG capsule Take 1 capsule by mouth 2 times daily 60 capsule 0    gabapentin (NEURONTIN) 100 MG capsule Take 1 capsule by mouth 3 times daily for 30 days. Call Office for refill 90 capsule 0    brimonidine (ALPHAGAN P) 0.1 % SOLN Place 1 drop into both eyes 2 times daily      dorzolamide (TRUSOPT) 2 % ophthalmic solution Place 2 drops into both eyes 3 times daily      LATANOPROST OP Place 1 drop into both eyes nightly      timolol (BETIMOL) 0.25 % ophthalmic solution Place 1 drop into the right eye 2 times daily      calcium carb-cholecalciferol 600-20 MG-MCG TABS Take 1 tablet by mouth daily      CPAP Machine MISC by Does not apply route nightly      Omega-3 Fatty Acids (FISH OIL BURP-LESS PO) Take by mouth daily      simvastatin (ZOCOR) 20 MG tablet Take 20 mg by mouth every evening      ENALAPRIL MALEATE PO Take 10 mg by mouth 2 times daily        No current facility-administered medications for this visit. Occupation: does not work.      Exercise regimen: none    Hobbies: makes her own cards    Patient Goals: pain relief    Precautions/Contraindications: recent surgery    OBJECTIVE:     Estimated body mass index is 27.4 kg/m² as calculated from the following:    Height as of 3/6/23: 5' 1\" (1.549 m). Weight as of 3/6/23: 145 lb (65.8 kg). Observations: well nourished female, normal affect. Rises slowly, in guarded manner from chair. Ambulates with wheeled walker. Inspection: Incision edges approximated, no purulent drainage, no redness, no swelling, no tenderness, and not hot to touch. Edema: mild, moderate global    Gait: antalgic gait, limp L LE, ambulates with wheeled walker    Joint/Motion:    Knee:  Right:   AROM: 118° Flexion,  -8° Extension  PROM: 122° Flexion,  -5° Extension  Left:   AROM: 96° Flexion,  -16° Extension  PROM: 98° Flexion,  -10° Extension    Strength:    Knee:   Right: Flexion 5/5,  Extension 5/5  Left: Flexion 4/5,  Extension 4/5    Palpation: Tender to palpation medial femoral condyle. ASSESSMENT     Max Mak is doing well after L TKA. Her primary problems are pain, limited extension and dependent gait. She has excellent flexion.  We will emphasize ROM and strength expecting her to do well    Outcome Measure:   Lower Extremity Functional Scale (LEFS) 72% impairment    Problems:   Pain 10/10 waxing / waning  Edema mild, moderate  ROM decreased  Strength decreased   Limitations with use of left lower extremity, standing tolerance , walking, limited tolerance to weightbearing tasks and weightbearing duration      [x] There are no barriers affecting plan of care or recovery    [] Barriers to this patient's plan of care or recovery include:    Domestic Concerns:  [x] No  [] Yes:    Short Term goals (2-3 weeks)  Pain 5/10  /-10  Strength 4+/5   Able to perform / complete the following functions / tasks: progress assistive device    Long Term goals (4-6 weeks)  Pain 2/10  /-5  Strength 5-/5  Able to perform / complete the following functions / tasks: ambulate without assistive device, normal gait, normal stair negotiation , tolerate weightbearing activities (standing, walking) for 30 minutes - 1 hour  LEFS 35% impairment  Independent with home exercise program (HEP)    Rehab Potential: [x] Good  [] Fair  [] Poor    PLAN       Treatment Plan:  instruction in home exercise program   therapeutic exercise   therapeutic activity   neuromuscular re-education   gait training   vasocompression     The following CPT codes are likely to be used in the care of this patient:   80854 PT Evaluation: Moderate Complexity   30739 Therapeutic Exercise   83693 Neuromuscular Re-Education   38186 Therapeutic Activities   81947 Manual Therapy   08904 Vasopneumatic Device     Suggested Professional Referral: [x] No  [] Yes:     Patient Education:  [x] Plans / Goals, Risks / Benefits discussed  [x] Home exercise program  Method of Education: [x] Verbal  [x] Demo  [x] Written  Comprehension of Education:  [x] Verbalizes understanding. [x] Demonstrates understanding. [] Needs Review. [] Demonstrates / verbalizes understanding of HEP / Gandhi Pott previously given. Frequency:  1-2 days per week for 4-6 weeks    Patient understands diagnosis/prognosis and consents to treatment, plan and goals: [x] Yes    [] No     Thank you for the opportunity to work with your patient. If you have questions or comments, please contact me at [phone]; fax: [phone]. Electronically signed by: Beti Hatfield, PT         Please sign Physician's Certification and return to: [address] [place] [address] PHYSICAL THERAPY  [address] [place] [id number]  Dept: [phone]  Dept Fax: [phone]  Loc: [phone] Certification / Comments     Frequency/Duration 1-2 days per week for 4-6 weeks. Certification period from 3/8/2023  to 6/1/2023. I have reviewed the Plan of Care established for skilled therapy services and certify that the services are required and that they will be provided while the patient is under my care.     Physician's Comments/Revisions:               Physician's Printed Name:                                           [de-identified] Signature:                                                               Date:

## 2023-03-13 ENCOUNTER — APPOINTMENT (OUTPATIENT)
Dept: CT IMAGING | Age: 78
End: 2023-03-13
Payer: MEDICARE

## 2023-03-13 ENCOUNTER — HOSPITAL ENCOUNTER (EMERGENCY)
Age: 78
Discharge: HOME | End: 2023-03-13
Attending: EMERGENCY MEDICINE
Payer: MEDICARE

## 2023-03-13 VITALS
HEART RATE: 77 BPM | HEIGHT: 61 IN | WEIGHT: 145 LBS | DIASTOLIC BLOOD PRESSURE: 71 MMHG | RESPIRATION RATE: 16 BRPM | SYSTOLIC BLOOD PRESSURE: 165 MMHG | OXYGEN SATURATION: 100 % | BODY MASS INDEX: 27.38 KG/M2 | TEMPERATURE: 97.3 F

## 2023-03-13 DIAGNOSIS — M54.50 ACUTE EXACERBATION OF CHRONIC LOW BACK PAIN: Primary | ICD-10-CM

## 2023-03-13 DIAGNOSIS — M48.56XS COMPRESSION FRACTURE OF LUMBAR SPINE, NON-TRAUMATIC, SEQUELA: ICD-10-CM

## 2023-03-13 DIAGNOSIS — G89.29 ACUTE EXACERBATION OF CHRONIC LOW BACK PAIN: Primary | ICD-10-CM

## 2023-03-13 DIAGNOSIS — M48.00 SPINAL STENOSIS, UNSPECIFIED SPINAL REGION: ICD-10-CM

## 2023-03-13 LAB
ANION GAP SERPL CALCULATED.3IONS-SCNC: 10 MMOL/L (ref 7–16)
BASOPHILS ABSOLUTE: 0.07 E9/L (ref 0–0.2)
BASOPHILS RELATIVE PERCENT: 0.6 % (ref 0–2)
BUN BLDV-MCNC: 24 MG/DL (ref 6–23)
C-REACTIVE PROTEIN: <0.3 MG/DL (ref 0–0.4)
CALCIUM SERPL-MCNC: 10.5 MG/DL (ref 8.6–10.2)
CHLORIDE BLD-SCNC: 101 MMOL/L (ref 98–107)
CO2: 28 MMOL/L (ref 22–29)
CREAT SERPL-MCNC: 0.8 MG/DL (ref 0.5–1)
EOSINOPHILS ABSOLUTE: 0.22 E9/L (ref 0.05–0.5)
EOSINOPHILS RELATIVE PERCENT: 1.9 % (ref 0–6)
GFR SERPL CREATININE-BSD FRML MDRD: >60 ML/MIN/1.73
GLUCOSE BLD-MCNC: 99 MG/DL (ref 74–99)
HCT VFR BLD CALC: 40.2 % (ref 34–48)
HEMOGLOBIN: 13.6 G/DL (ref 11.5–15.5)
IMMATURE GRANULOCYTES #: 0.07 E9/L
IMMATURE GRANULOCYTES %: 0.6 % (ref 0–5)
LYMPHOCYTES ABSOLUTE: 4.13 E9/L (ref 1.5–4)
LYMPHOCYTES RELATIVE PERCENT: 36.2 % (ref 20–42)
MCH RBC QN AUTO: 32.3 PG (ref 26–35)
MCHC RBC AUTO-ENTMCNC: 33.8 % (ref 32–34.5)
MCV RBC AUTO: 95.5 FL (ref 80–99.9)
MONOCYTES ABSOLUTE: 0.73 E9/L (ref 0.1–0.95)
MONOCYTES RELATIVE PERCENT: 6.4 % (ref 2–12)
NEUTROPHILS ABSOLUTE: 6.18 E9/L (ref 1.8–7.3)
NEUTROPHILS RELATIVE PERCENT: 54.3 % (ref 43–80)
PDW BLD-RTO: 13.8 FL (ref 11.5–15)
PLATELET # BLD: 311 E9/L (ref 130–450)
PMV BLD AUTO: 9.8 FL (ref 7–12)
POTASSIUM SERPL-SCNC: 4.1 MMOL/L (ref 3.5–5)
RBC # BLD: 4.21 E12/L (ref 3.5–5.5)
SEDIMENTATION RATE, ERYTHROCYTE: 7 MM/HR (ref 0–20)
SODIUM BLD-SCNC: 139 MMOL/L (ref 132–146)
WBC # BLD: 11.4 E9/L (ref 4.5–11.5)

## 2023-03-13 PROCEDURE — 85025 COMPLETE CBC W/AUTO DIFF WBC: CPT

## 2023-03-13 PROCEDURE — 80048 BASIC METABOLIC PNL TOTAL CA: CPT

## 2023-03-13 PROCEDURE — 96374 THER/PROPH/DIAG INJ IV PUSH: CPT

## 2023-03-13 PROCEDURE — 6360000004 HC RX CONTRAST MEDICATION: Performed by: RADIOLOGY

## 2023-03-13 PROCEDURE — 72132 CT LUMBAR SPINE W/DYE: CPT

## 2023-03-13 PROCEDURE — 85651 RBC SED RATE NONAUTOMATED: CPT

## 2023-03-13 PROCEDURE — 6360000002 HC RX W HCPCS: Performed by: EMERGENCY MEDICINE

## 2023-03-13 PROCEDURE — 86140 C-REACTIVE PROTEIN: CPT

## 2023-03-13 PROCEDURE — 99285 EMERGENCY DEPT VISIT HI MDM: CPT

## 2023-03-13 RX ORDER — KETOROLAC TROMETHAMINE 15 MG/ML
15 INJECTION, SOLUTION INTRAMUSCULAR; INTRAVENOUS ONCE
Status: COMPLETED | OUTPATIENT
Start: 2023-03-13 | End: 2023-03-13

## 2023-03-13 RX ADMIN — IOPAMIDOL 75 ML: 755 INJECTION, SOLUTION INTRAVENOUS at 13:05

## 2023-03-13 RX ADMIN — KETOROLAC TROMETHAMINE 15 MG: 15 INJECTION, SOLUTION INTRAMUSCULAR; INTRAVENOUS at 11:17

## 2023-03-13 ASSESSMENT — ENCOUNTER SYMPTOMS
BACK PAIN: 1
ABDOMINAL PAIN: 0
SHORTNESS OF BREATH: 0

## 2023-03-13 ASSESSMENT — PAIN DESCRIPTION - ORIENTATION: ORIENTATION: LOWER

## 2023-03-13 ASSESSMENT — PAIN SCALES - GENERAL
PAINLEVEL_OUTOF10: 10
PAINLEVEL_OUTOF10: 8

## 2023-03-13 ASSESSMENT — PAIN DESCRIPTION - LOCATION: LOCATION: BACK

## 2023-03-13 NOTE — ED PROVIDER NOTES
Patient presents emergency department with complaint of low back pain. She had a spinal on 2/20/2023 for knee arthroscopy. Patient states that since this time she has been having back discomfort. Dr. Gloria Redd placed her on prednisone and she states she had no improvement following the completion of her prescription. She is also currently taking gabapentin and Celebrex without any relief. Pain medications have been provided at time of surgery. Patient states that the pain is mainly in the right lower back. It radiates into her buttocks. She has worsening pain with straight leg raise and rolling of the hip. She denies any fevers or chills. No urinary incontinence. The history is provided by the patient and the spouse. Review of Systems   Constitutional:  Positive for activity change. Negative for fatigue and fever. Respiratory:  Negative for shortness of breath. Cardiovascular:  Negative for chest pain. Gastrointestinal:  Negative for abdominal pain. Genitourinary:  Negative for dysuria and flank pain. Musculoskeletal:  Positive for arthralgias and back pain. Neurological:  Negative for weakness and numbness. Physical Exam  Vitals and nursing note reviewed. Constitutional:       General: She is not in acute distress. Appearance: Normal appearance. She is well-developed and normal weight. She is not ill-appearing or toxic-appearing. HENT:      Head: Normocephalic and atraumatic. Eyes:      Pupils: Pupils are equal, round, and reactive to light. Cardiovascular:      Rate and Rhythm: Normal rate and regular rhythm. Pulses: Normal pulses. Heart sounds: Normal heart sounds. No murmur heard. Pulmonary:      Effort: Pulmonary effort is normal. No respiratory distress. Breath sounds: Normal breath sounds. No wheezing or rales. Abdominal:      General: Bowel sounds are normal.      Palpations: Abdomen is soft. Tenderness: There is no abdominal tenderness. There is no guarding or rebound. Musculoskeletal:         General: Tenderness (Right lumbar paraspinous muscles. Tenderness appreciated over right SI joint. Positive straight leg raise. Pain with rolling of the hip. No midline lumbar tenderness.) present. Normal range of motion. Cervical back: Normal range of motion and neck supple. Right lower leg: No edema. Left lower leg: No edema. Skin:     General: Skin is warm and dry. Capillary Refill: Capillary refill takes less than 2 seconds. Findings: No bruising or erythema. Neurological:      General: No focal deficit present. Mental Status: She is alert and oriented to person, place, and time. Mental status is at baseline. Cranial Nerves: No cranial nerve deficit. Sensory: No sensory deficit. Motor: No weakness. Coordination: Coordination normal.       Procedures    Medical Decision Making  Amount and/or Complexity of Data Reviewed  Labs: ordered. Radiology: ordered. Risk  Prescription drug management. Patient continues to have low back pain after taking prednisone and Celebrex and pain medications. Patient states that she has been having the pain since she had an epidural for her knee surgery. Patient denies any fevers or chills. There is no neurologic deficits to suggest cauda equina. On physical exam the patient has good mobility in the wheelchair. She has mild tenderness appreciated to the right lumbar area and overlying the right SI joint. There is no erythema or fluctuance of the soft tissues. There is no midline point tenderness of the lumbar spine. I will evaluate the patient with the CT and labs. Differential diagnosis includes lumbar or SI joint strain secondary to positioning during surgery, Epidural abscess, sciatica. Patient without any concerning findings regarding her recent epidural.  She does have multiple areas of stenosis and disc degeneration.   She also has areas of subacute compression fracture. I have talked with the patient and her . She has been taking all the appropriate medications that should be helping with her pain. She is not in any acute distress today. I advised him to follow-up with pain management for possible injection to the area. She also has scheduled appointments for physical therapy for her recent knee arthroscopy. I advised her to have them assist her with her back pain as well. She states understanding and will follow-up as an outpatient.          --------------------------------------------- PAST HISTORY ---------------------------------------------  Past Medical History:  has a past medical history of Breast cancer ([facility] Cancer ([facility] GERD (gastroesophageal reflux disease), History of therapeutic radiation, Hyperlipidemia, Hypertension, PONV (postoperative nausea and vomiting), and Sleep apnea. Past Surgical History:  has a past surgical history that includes joint replacement (Right); Cholecystectomy; Breast surgery (Right, 2000); Dilation and curettage of uterus; Foot surgery (Right, 09/27/2017); Breast lumpectomy (Right, 2000); Breast biopsy (Left, 1991); Breast biopsy (Left, 1994); Breast biopsy (Right, 2000); Hysterectomy (1998); [facility] STEREO BREAST BX W LOC DEVICE 1ST LESION RIGHT (Right, 12/8/2021); and Total knee arthroplasty (Left, 2/20/2023). Social History:  reports that she has never smoked. She has never used smokeless tobacco. She reports that she does not drink alcohol and does not use drugs. Family History: family history includes Breast Cancer in her maternal aunt; Rectal Cancer in her mother. The patients home medications have been reviewed. Allergies: Patient has no known allergies.     -------------------------------------------------- RESULTS -------------------------------------------------  Labs:  Results for orders placed or performed during the hospital encounter of 03/13/23   CBC with Auto Differential   Result Value Ref Range    WBC 11.4 4.5 - 11.5 E9/L    RBC 4.21 3.50 - 5.50 E12/L    Hemoglobin 13.6 11.5 - 15.5 g/dL    Hematocrit 40.2 34.0 - 48.0 %    MCV 95.5 80.0 - 99.9 fL    MCH 32.3 26.0 - 35.0 pg    MCHC 33.8 32.0 - 34.5 %    RDW 13.8 11.5 - 15.0 fL    Platelets 561 495 - 403 E9/L    MPV 9.8 7.0 - 12.0 fL    Neutrophils % 54.3 43.0 - 80.0 %    Immature Granulocytes % 0.6 0.0 - 5.0 %    Lymphocytes % 36.2 20.0 - 42.0 %    Monocytes % 6.4 2.0 - 12.0 %    Eosinophils % 1.9 0.0 - 6.0 %    Basophils % 0.6 0.0 - 2.0 %    Neutrophils Absolute 6.18 1.80 - 7.30 E9/L    Immature Granulocytes # 0.07 E9/L    Lymphocytes Absolute 4.13 (H) 1.50 - 4.00 E9/L    Monocytes Absolute 0.73 0.10 - 0.95 E9/L    Eosinophils Absolute 0.22 0.05 - 0.50 E9/L    Basophils Absolute 0.07 0.00 - 0.20 G9/U   Basic Metabolic Panel   Result Value Ref Range    Sodium 139 132 - 146 mmol/L    Potassium 4.1 3.5 - 5.0 mmol/L    Chloride 101 98 - 107 mmol/L    CO2 28 22 - 29 mmol/L    Anion Gap 10 7 - 16 mmol/L    Glucose 99 74 - 99 mg/dL    BUN 24 (H) 6 - 23 mg/dL    Creatinine 0.8 0.5 - 1.0 mg/dL    Est, Glom Filt Rate >60 >=60 mL/min/1.73    Calcium 10.5 (H) 8.6 - 10.2 mg/dL   Sedimentation Rate   Result Value Ref Range    Sed Rate 7 0 - 20 mm/Hr       Radiology:  CT LUMBAR SPINE W CONTRAST   Final Result   Age indeterminate compression fracture at the superior endplate of L2 with up   to 15% height loss, likely subacute. Mild chronic compression deformity at the superior endplate of L4. Degenerative disc disease as described above, exacerbating congenitally   narrow lumbar spinal canal.      Spinal canal narrowing, moderate to severe at L3-4, moderate at L4-5, mild at   L1-2 and L2-3. Narrowing of the left lateral recess at L3-4, possibly contacting the left   descending L4 nerve root. Narrowing of the left lateral recess at L4-5, possibly contacting the left   descending L5 nerve root.       Narrowing of the left lateral recess at L5-S1, possibly contacting the left   descending S1 nerve root. Foraminal narrowing, severe at left L4-5, moderate at right L1-2, left L2-3   and left L3-4, mild to moderate at right L4-5 and right L5-S1.             ------------------------- NURSING NOTES AND VITALS REVIEWED ---------------------------  Date / Time Roomed:  3/13/2023 10:23 AM  ED Bed Assignment:  Internal Waiting/IntWait    The nursing notes within the ED encounter and vital signs as below have been reviewed. BP (!) 165/71   Pulse 77   Temp 97.3 °F (36.3 °C)   Resp 16   Ht 5' 1\" (1.549 m)   Wt 145 lb (65.8 kg)   SpO2 100%   BMI 27.40 kg/m²   Oxygen Saturation Interpretation: Normal      ------------------------------------------ PROGRESS NOTES ------------------------------------------  I have spoken with the patient and discussed todays results, in addition to providing specific details for the plan of care and counseling regarding the diagnosis and prognosis. Their questions are answered at this time and they are agreeable with the plan. I discussed at length with them reasons for immediate return here for re evaluation. They will followup with primary care by calling their office tomorrow. --------------------------------- ADDITIONAL PROVIDER NOTES ---------------------------------  At this time the patient is without objective evidence of an acute process requiring hospitalization or inpatient management. They have remained hemodynamically stable throughout their entire ED visit and are stable for discharge with outpatient follow-up. The plan has been discussed in detail and they are aware of the specific conditions for emergent return, as well as the importance of follow-up. Discharge Medication List as of 3/13/2023  3:12 PM          Diagnosis:  1. Acute exacerbation of chronic low back pain    2. Spinal stenosis, unspecified spinal region    3.  Compression fracture of lumbar spine, non-traumatic, sequela        Disposition:  Patient's disposition: Discharge to home  Patient's condition is stable.             Carrol Barriga DO  03/13/23 1536

## 2023-03-14 ENCOUNTER — TREATMENT (OUTPATIENT)
Dept: PHYSICAL THERAPY | Age: 78
End: 2023-03-14
Payer: MEDICARE

## 2023-03-14 DIAGNOSIS — M17.12 PRIMARY OSTEOARTHRITIS OF LEFT KNEE: Primary | ICD-10-CM

## 2023-03-14 PROCEDURE — 97110 THERAPEUTIC EXERCISES: CPT

## 2023-03-14 NOTE — PROGRESS NOTES
Physical Therapy Daily Treatment Note    Date: 3/14/2023  Patient Name: Bethany Arredondo  : 1945   MRN: [id number]  DOInjury: -  DOSx: 2023  Referring Provider: Samantha Lopez DO  [address] [facility]     Medical Diagnosis:      Diagnosis Orders   1. Primary osteoarthritis of left knee            Outcome Measure:  LEFS 72% impairment        X = TO BE PERFORMED NEXT VISIT  > = PROGRESS TO THIS    S: pt reports continued soreness/pain, along with lying flat on bed really increases her pain . O: Discussed anatomy, physiology, body mechanics, principles of loading, and progressive loading/activity. Time  0679-9405 am      Visit  2 visit  Repeat outcome measure at mid point and end. Pain    Pain at rest 10/10     ROM  96/-16     Modalities          MO   Manual      Stretching knee flexion   MT   Stretching       Patella mobs X 30 new   TE   Prone hangs   TE   Heel props   TE   Knee flex stretch-seated 3 x 30 sec new  TE   Prone self flexion stretch   TE   Exercise   Lying supine bothers pt at this time     Nustep  L 5 x 5 min   TE         SAQ x  TE   Heel slides 3 x 10 seated   TE   SLR Causes back pain at this time   TE   LAQ 3 x 10  TE   Marching   TA   Squat    TA   Step-ups - FWD    TA   Step-ups - LAT   TA   Step-ups - BWD    TA   Step up and over reciprocally    TA   [] TG  [] Leg Press 2-leg x  TE   [] TG  [] Leg Press 1-leg   TE   CR    TE   Knee Extension Machine   TE   Marching gait   NR   Side stepping   NR               A: Tolerated well. Pt just discharged recently from [address] [place] [address],[address] visit due to c/o extreme back pain. Limited ex's this session for comfort .   P: Continue with rehab plan  Verona Villatoro PTA    Treatment Charges: Mins Units   Initial Evaluation     Re-Evaluation     Ther Exercise         TE 30 2   Manual Therapy     MT     Ther Activities        TA     Gait Training          GT     Neuro Re-education NR     Modalities     Non-Billable Service Time     Other Total Time/Units 30 2

## 2023-03-16 ENCOUNTER — TREATMENT (OUTPATIENT)
Dept: PHYSICAL THERAPY | Age: 78
End: 2023-03-16

## 2023-03-16 DIAGNOSIS — M17.12 PRIMARY OSTEOARTHRITIS OF LEFT KNEE: Primary | ICD-10-CM

## 2023-03-16 NOTE — PROGRESS NOTES
Physical Therapy Daily Treatment Note    Date: 3/16/2023  Patient Name: Kristofer Watson  : 1945   MRN: [id number]  DOInjury: -  DOSx: 2023  Referring Provider: [place] [address]     Medical Diagnosis:      Diagnosis Orders   1. Primary osteoarthritis of left knee            Outcome Measure:  LEFS 72% impairment        X = TO BE PERFORMED NEXT VISIT  > = PROGRESS TO THIS    S: pt reports continued soreness/pain .   ( lying flat on bed really increases her back pain  due to compression fracture in her lower spine ) .  O: Discussed anatomy, physiology, body mechanics, principles of loading, and progressive loading/activity.    Time  6139-6618 am      Visit  3 visit  Repeat outcome measure at mid point and end.    Pain    Pain at rest 10/10     ROM  96/- -10  3/16/2023     Modalities          MO   Manual      Stretching knee flexion   MT   Stretching       Patella mobs X 30   TE   Prone hangs   TE   Heel props seated  1 x 2 min seated   TE   Knee flex stretch-seated 3 x 30 sec   TE   Prone self flexion stretch   TE   Exercise   Lying supine bothers pt at this time due to compression fx of spine .ok to perform therapy ex's     Nustep  L 5 x 10 min   TE         SAQ x  TE   Heel slides 3 x 10 seated in lowest position of high/low mat in back room   TE   SLR Causes back pain at this time   TE   LAQ 3 x 10  TE   Marching   TA   Squat    TA   Step-ups - FWD    TA   Step-ups - LAT   TA   Step-ups - BWD    TA   Step up and over reciprocally    TA   [] TG  [] Leg Press 2-leg x  TE   [] TG  [] Leg Press 1-leg   TE   CR    TE   Knee Extension Machine   TE   Marching gait   NR   Side stepping   NR               A: Tolerated better with all seated ex's due to back soreness/pain.   P: Continue with rehab plan  Jeff Garcia PTA    Treatment Charges: Mins Units   Initial Evaluation     Re-Evaluation     Ther Exercise         TE 30 2   Manual Therapy     MT     Ther Activities       TA     Gait Training          GT     Neuro Re-education NR     Modalities     Non-Billable Service Time     Other     Total Time/Units 30 2

## 2023-03-17 ENCOUNTER — TELEPHONE (OUTPATIENT)
Dept: ORTHOPEDIC SURGERY | Age: 78
End: 2023-03-17

## 2023-03-17 DIAGNOSIS — M17.12 PRIMARY OSTEOARTHRITIS OF LEFT KNEE: ICD-10-CM

## 2023-03-17 RX ORDER — HYDROCODONE BITARTRATE AND ACETAMINOPHEN 5; 325 MG/1; MG/1
1 TABLET ORAL EVERY 6 HOURS PRN
Qty: 28 TABLET | Refills: 0 | Status: SHIPPED | OUTPATIENT
Start: 2023-03-17 | End: 2023-03-24

## 2023-03-17 NOTE — TELEPHONE ENCOUNTER
.Last appointment 3/6/2023  Next appointment   Future Appointments   Date Time Provider Rachel Aixa   3/21/2023  9:30 AM Asad Salinas MD [facility]   3/21/2023 10:00 AM Cheryl Alvarez PTA [facility]   3/23/2023 10:00 AM Cheryl Alvarez PTA [facility]   3/27/2023  9:00 AM Buddy Fowler PTA [facility]   3/27/2023 10:00 AM Belma Leyden, DO Anama Leyden [facility]   3/30/2023 10:00 AM Buddy Fowler PTA [facility]      Last refill:  03/06/2023  DOS: 02/20/2023      Patient called in requesting refill of:    HYDROcodone-acetaminophen ([facility]) 5-325 MG per tablet     [address] - [place], [place] - [address] [facility] [place] [phone] Kena Earnest [phone]   [address] [place] [address], [address]

## 2023-03-21 ENCOUNTER — OFFICE VISIT (OUTPATIENT)
Dept: PAIN MANAGEMENT | Age: 78
End: 2023-03-21
Payer: MEDICARE

## 2023-03-21 ENCOUNTER — TREATMENT (OUTPATIENT)
Dept: PHYSICAL THERAPY | Age: 78
End: 2023-03-21

## 2023-03-21 VITALS
SYSTOLIC BLOOD PRESSURE: 114 MMHG | WEIGHT: 145 LBS | DIASTOLIC BLOOD PRESSURE: 72 MMHG | HEIGHT: 61 IN | OXYGEN SATURATION: 99 % | BODY MASS INDEX: 27.38 KG/M2 | HEART RATE: 79 BPM

## 2023-03-21 DIAGNOSIS — M51.9 LUMBAR DISC DISORDER: Primary | ICD-10-CM

## 2023-03-21 DIAGNOSIS — Z96.651 S/P TOTAL KNEE ARTHROPLASTY, RIGHT: ICD-10-CM

## 2023-03-21 DIAGNOSIS — S32.020A CLOSED COMPRESSION FRACTURE OF L2 LUMBAR VERTEBRA, INITIAL ENCOUNTER (HCC): ICD-10-CM

## 2023-03-21 DIAGNOSIS — M47.816 LUMBAR FACET ARTHROPATHY: ICD-10-CM

## 2023-03-21 DIAGNOSIS — M48.061 SPINAL STENOSIS OF LUMBAR REGION WITHOUT NEUROGENIC CLAUDICATION: ICD-10-CM

## 2023-03-21 DIAGNOSIS — M47.816 LUMBAR SPONDYLOSIS: ICD-10-CM

## 2023-03-21 DIAGNOSIS — M17.12 PRIMARY OSTEOARTHRITIS OF LEFT KNEE: Primary | ICD-10-CM

## 2023-03-21 PROCEDURE — 1124F ACP DISCUSS-NO DSCNMKR DOCD: CPT | Performed by: PAIN MEDICINE

## 2023-03-21 PROCEDURE — 99204 OFFICE O/P NEW MOD 45 MIN: CPT | Performed by: PAIN MEDICINE

## 2023-03-21 RX ORDER — DIAZEPAM 5 MG/1
TABLET ORAL
Qty: 1 TABLET | Refills: 0 | OUTPATIENT
Start: 2023-03-21 | End: 2023-04-04

## 2023-03-21 NOTE — PROGRESS NOTES
Physical Therapy Daily Treatment Note    Date: 3/21/2023  Patient Name: Danielle Fajardo  : 1945   MRN: [id number]  DOInjury: -  DOSx: 2023  Referring Provider: Travis Boyd DO  [address]  [facility]     Medical Diagnosis:      Diagnosis Orders   1. Primary osteoarthritis of left knee        2. S/P total knee arthroplasty, right              Outcome Measure:  LEFS 72% impairment        X = TO BE PERFORMED NEXT VISIT  > = PROGRESS TO THIS    S: pt reports continued soreness/pain . ( lying flat on bed really increases her back pain  due to compression fracture in her lower spine ) . O: Discussed anatomy, physiology, body mechanics, principles of loading, and progressive loading/activity. Time  8027-4915      Visit  4 visit  Repeat outcome measure at mid point and end. Pain    Pain at rest 10/10     ROM  96/- -10  3/16/2023     Modalities          MO   Manual      Stretching knee flexion   MT   Stretching       Patella mobs X 30  Re instructed  TE   Prone hangs   TE   Heel props seated  1 x 2 min seated   TE   Knee flex stretch-seated 3 x 30 sec   TE   Prone self flexion stretch   TE   Exercise   Lying supine bothers pt at this time due to compression fx of spine . ok to perform therapy ex's     Nustep  L 5 x 10 min   TE         SAQ x  TE   Heel slides 3 x 10 seated in lowest position of high/low mat in back room   TE   SLR Causes back pain at this time   TE   LAQ 2# 2 x 15  TE   CR 20x     Marching 20x  TA   Squat  20x  TA   Step-ups - FWD  6\" 20x  TA   Step-ups - LAT   TA   Step-ups - BWD    TA   Step up and over reciprocally    TA   [] TG  [] Leg Press 2-leg next  TE   [] TG  [] Leg Press 1-leg   TE   CR    TE   Knee Extension Machine   TE   Marching gait   NR   Side stepping   NR               A: Tolerated better with all seated ex's due to back soreness/pain. Reinst pt and had pt perform pat mobs.    P: Continue with rehab plan  Gabriele Wu PTA    Treatment Charges:

## 2023-03-21 NOTE — PROGRESS NOTES
Edson Lockett presents to the [facility] on 3/21/2023. Kristofer is complaining of pain in back. The pain is constant. The pain is described as aching. Pain is rated on her best day at a 0, on her worst day at a 10, and on average at a 10 on the VAS scale. She took her last dose of Norco today. Sariah Young does not have issues with constipation. Any procedures since your last visit: No.    She is  on NSAIDS and  is  on anticoagulation medications to include ASA and is managed by Jayleen Britton DO  . Pacemaker or defibrillator: No.    Medication Contract and Consent for Opioid Use Documents Filed        No documents found                       There were no vitals taken for this visit. No LMP recorded. Patient has had a hysterectomy.
appearance:   elderly, pleasant, and well-hydrated. , in moderate discomfort and A & O x3  Build:Normal Weight    HEENT:    Head:normocephalic and atraumatic  Sclera: icterus absent,     Lungs:    Breathing:Breathing Pattern: regular, no distress    Abdomen:    Shape:non-distended and normal  Tenderness:none    Lumbar spine:    Spine inspection:normal   CVA tenderness:No   Palpation:tenderness paravertebral muscles, midline tenderness, facet loading, left, right, positive, and tenderness. Range of motion:abnormal moderately Lateral bending, flexion, extension rotation bilateral and is  painful. Musculoskeletal:    Trigger points in Paraveteral:absent bilaterally  SI joint tenderness:negative right, negative left              VONNIE test:not done right, not done             left  Piriformis tenderness:negative right, negative left  Trochanteric bursa tenderness:negative right, negative left  SLR:negative right, negative left, sitting     Extremities:    Tremors:None bilaterally upper and lower  Range of motion:pain with internal rotation of hips negative. Intact:Yes  Edema:Normal    Knee: Inspection:Bilateral knees replaced. Neurological:    Sensory:normal to light touch bilateral lower extremities. Motor:                         Right Quadriceps5-/5          Left Quadriceps5-/5           Right Gastrocnemius5-/5    Left Gastrocnemius5-/5  Right Ant Tibialis5-/5  Left Ant Tibialis5-/5  Reflexes:    Right Quadriceps reflexNOT DONE  Left Quadriceps reflexNOT DONE  Right Achilles reflex2+  Left Achilles reflex2+  Gait:antalgic with a walker. Dermatology:    Skin:no unusual rashes and no skin lesions    Impression:  Low back pain with no radicular symptoms and L1-2 midline tenderness. Lumbar spine CT 2023 Spinal canal narrowing, moderate to severe at L3-4, moderate at L4-5 with 15 % compression fracture of L2. Plan:  Reviewed lumbar spine CT. Discussed pathology and treatment options.   Discussed L1

## 2023-03-23 ENCOUNTER — TREATMENT (OUTPATIENT)
Dept: PHYSICAL THERAPY | Age: 78
End: 2023-03-23

## 2023-03-23 DIAGNOSIS — M17.12 PRIMARY OSTEOARTHRITIS OF LEFT KNEE: Primary | ICD-10-CM

## 2023-03-23 DIAGNOSIS — Z96.651 S/P TOTAL KNEE ARTHROPLASTY, RIGHT: ICD-10-CM

## 2023-03-23 NOTE — PROGRESS NOTES
in back room   TE   SLR Causes back pain at this time   TE   LAQ 2# 2 x 15  TE   CR 20x     Marching 20x  TA   Squat  20x  TA   Step-ups - FWD  6\" 20x  TA   Step-ups - LAT   TA   Step-ups - BWD    TA   Step up and over reciprocally    TA   [] TG  [] Leg Press 2-leg next  TE   [] TG  [] Leg Press 1-leg   TE   CR    TE   Knee Extension Machine   TE   Marching gait   NR   Side stepping   NR               A: Tolerated better with all seated ex's due to back soreness/pain. Reinst pt and had pt perform pat mobs.    P: Continue with rehab plan  Rebekah Moody PTA    Treatment Charges: Mins Units   Initial Evaluation     Re-Evaluation     Ther Exercise         TE 30 2   Manual Therapy     MT     Ther Activities        TA 15 1   Gait Training          GT     Neuro Re-education NR     Modalities 15 1   Non-Billable Service Time     Other     Total Time/Units 60 4

## 2023-03-27 ENCOUNTER — TREATMENT (OUTPATIENT)
Dept: PHYSICAL THERAPY | Age: 78
End: 2023-03-27
Payer: MEDICARE

## 2023-03-27 ENCOUNTER — OFFICE VISIT (OUTPATIENT)
Dept: ORTHOPEDIC SURGERY | Age: 78
End: 2023-03-27

## 2023-03-27 VITALS — BODY MASS INDEX: 27.38 KG/M2 | WEIGHT: 145 LBS | TEMPERATURE: 98 F | HEIGHT: 61 IN

## 2023-03-27 DIAGNOSIS — M17.12 PRIMARY OSTEOARTHRITIS OF LEFT KNEE: ICD-10-CM

## 2023-03-27 DIAGNOSIS — M17.12 PRIMARY OSTEOARTHRITIS OF LEFT KNEE: Primary | ICD-10-CM

## 2023-03-27 DIAGNOSIS — Z96.652 S/P TOTAL KNEE ARTHROPLASTY, LEFT: Primary | ICD-10-CM

## 2023-03-27 PROCEDURE — 97530 THERAPEUTIC ACTIVITIES: CPT

## 2023-03-27 PROCEDURE — 97110 THERAPEUTIC EXERCISES: CPT

## 2023-03-27 PROCEDURE — 99024 POSTOP FOLLOW-UP VISIT: CPT | Performed by: ORTHOPAEDIC SURGERY

## 2023-03-27 RX ORDER — HYDROCODONE BITARTRATE AND ACETAMINOPHEN 5; 325 MG/1; MG/1
1 TABLET ORAL EVERY 6 HOURS PRN
Qty: 28 TABLET | Refills: 0 | Status: SHIPPED | OUTPATIENT
Start: 2023-03-27 | End: 2023-04-03

## 2023-03-27 NOTE — PROGRESS NOTES
pain at this time   TE   LAQ 2# 2 x 15  TE   CR Ingrown toe nail     Marching 20x  TA   Squat  20x  TA   Step-ups - FWD  6\" 20x  TA   Step-ups - LAT 6\" 20x new   TA   Step-ups - BWD    TA   Step up and over reciprocally    TA   [] TG  [] Leg Press 2-leg next  TE   [] TG  [] Leg Press 1-leg   TE   CR    TE   Knee Extension Machine   TE   Marching gait   NR   Side stepping   NR               A: Tolerated well with c//o soreness while performing  manual patella mobs today . P: Continue with rehab plan . Pt has a RTD today after PT , no modalities to maintain AROM.    Marv Mayen PTA    Treatment Charges: Mins Units   Initial Evaluation     Re-Evaluation     Ther Exercise         TE 30 2   Manual Therapy     MT     Ther Activities        TA 10 1   Gait Training          GT     Neuro Re-education NR     Modalities     Non-Billable Service Time     Other     Total Time/Units 40 3

## 2023-03-27 NOTE — PROGRESS NOTES
Post-Operative week: 5 Status Post left Total Knee Arthroplasty, surgery date 2/20/2023  Systemic or Specific Complaints:No Complaints    Objective:     General: alert, appears stated age, and cooperative   Wound: Wound clean and dry no evidence of infection. , No Erythema, No Edema, and No Drainage   Motion: Flexion: 110 to 0 Degrees   DVT Exam: No evidence of DVT seen on physical exam.  Negative Alex's sign. No cords or calf tenderness. Xrays:  Not performed today. Radiographic findings reviewed with patient    Assessment:     Encounter Diagnoses   Name Primary? S/P total knee arthroplasty, left Yes    Primary osteoarthritis of left knee       Doing well postoperatively. Plan:     She will continue with physical therapy. She can D/C RAHEL hose and ASA. I will see her back in 2 months.

## 2023-03-30 ENCOUNTER — TREATMENT (OUTPATIENT)
Dept: PHYSICAL THERAPY | Age: 78
End: 2023-03-30

## 2023-03-30 DIAGNOSIS — M17.12 PRIMARY OSTEOARTHRITIS OF LEFT KNEE: Primary | ICD-10-CM

## 2023-03-30 NOTE — PROGRESS NOTES
Physical Therapy Daily Treatment Note    Date: 3/30/2023  Patient Name: Phong Carranza  : 1945   MRN: [id number]  DOInjury: -  DOSx: 2023  Referring Provider: Aubrey Walker DO  [address] [facility] Diagnosis:      Diagnosis Orders   1. Primary osteoarthritis of left knee                Outcome Measure:  LEFS 72% impairment        X = TO BE PERFORMED NEXT VISIT  > = PROGRESS TO THIS    S: pt reports continued swelling / soreness in knee. ( lying flat on bed really increases her back pain  due to compression fracture in her lower spine getting an MRI next week then possible surgery. O: Discussed anatomy, physiology, body mechanics, principles of loading, and progressive loading/activity. Access Code: [email or web address]  URL: [email or web address]  Date: 2023  Prepared by: Maury Small    Exercises  - Supine Heel Slide  - 1 x daily - 7 x weekly - 3 sets - 10 reps  - Mini Squat with Counter Support  - 1 x daily - 7 x weekly - 3 sets - 10 reps  - Standing March with Counter Support  - 1 x daily - 7 x weekly - 3 sets - 10 reps  - Seated Knee Extension Stretch with Chair  - 2 x daily - 7 x weekly - 5 min hold  - Seated Knee Flexion Stretch  - 2 x daily - 7 x weekly - 1 sets - 3 reps - 1 min hold    Time  1000- 1040 am      Visit    visit  Repeat outcome measure at mid point and end. Pain    Pain at rest 5/10     ROM  112/- -5  3/30/2023     Modalities       Compression/ice  MO   Manual      Stretching knee flexion   MT   Stretching       Patella mobs X 30  Re instructed  TE   Prone hangs   TE   Heel props seated  1 x 4 min seated   TE   Knee flex stretch-seated 3 x 1 min  TE   Prone self flexion stretch   TE   Exercise   Lying supine bothers pt at this time due to compression fx of spine . ok to perform therapy ex's     Nustep  L 5 x  10 min   TE         SAQ x  TE   Heel slides  TE   SLR Causes back pain at this time   TE   LAQ 2# 2 x 15  TE   CR 20x

## 2023-04-03 ENCOUNTER — TREATMENT (OUTPATIENT)
Dept: PHYSICAL THERAPY | Age: 78
End: 2023-04-03
Payer: MEDICARE

## 2023-04-03 DIAGNOSIS — M17.12 PRIMARY OSTEOARTHRITIS OF LEFT KNEE: Primary | ICD-10-CM

## 2023-04-03 PROCEDURE — 97110 THERAPEUTIC EXERCISES: CPT

## 2023-04-03 PROCEDURE — 97530 THERAPEUTIC ACTIVITIES: CPT

## 2023-04-03 NOTE — PROGRESS NOTES
Ingrown toe nail     Marching 20x  TA   Squat  20x  TA   Side kicks  20x     Step-ups - FWD  6\" 20x  TA   Step-ups - LAT 6\" 20x   TA   Step-ups - BWD    TA   Step up and over reciprocally    TA   [] TG  [] Leg Press 2-leg next  TE   [] TG  [] Leg Press 1-leg   TE   CR    TE   Knee Extension Machine   TE   Marching gait   NR   Side stepping   NR    135 feet x 1 using str. cane            A: Tolerated well  . P: Continue with rehab plan x 4 more sessions. Jonathon Dawson  RTD 5/30/2023  José Miguel Pica, PTA    Treatment Charges: Mins Units   Initial Evaluation     Re-Evaluation     Ther Exercise         TE 30 2   Manual Therapy     MT     Ther Activities        TA 10 1   Gait Training          GT     Neuro Re-education NR     Modalities     Non-Billable Service Time     Other     Total Time/Units 40 3

## 2023-04-04 ENCOUNTER — OFFICE VISIT (OUTPATIENT)
Dept: PAIN MANAGEMENT | Age: 78
End: 2023-04-04
Payer: MEDICARE

## 2023-04-04 VITALS
WEIGHT: 145 LBS | HEIGHT: 61 IN | HEART RATE: 82 BPM | SYSTOLIC BLOOD PRESSURE: 120 MMHG | OXYGEN SATURATION: 96 % | BODY MASS INDEX: 27.38 KG/M2 | DIASTOLIC BLOOD PRESSURE: 76 MMHG

## 2023-04-04 DIAGNOSIS — S32.020A CLOSED COMPRESSION FRACTURE OF L2 LUMBAR VERTEBRA, INITIAL ENCOUNTER (HCC): ICD-10-CM

## 2023-04-04 DIAGNOSIS — M47.816 LUMBAR FACET ARTHROPATHY: ICD-10-CM

## 2023-04-04 DIAGNOSIS — M48.061 SPINAL STENOSIS OF LUMBAR REGION WITHOUT NEUROGENIC CLAUDICATION: ICD-10-CM

## 2023-04-04 DIAGNOSIS — M47.816 LUMBAR SPONDYLOSIS: ICD-10-CM

## 2023-04-04 DIAGNOSIS — G89.4 CHRONIC PAIN SYNDROME: Primary | ICD-10-CM

## 2023-04-04 DIAGNOSIS — M51.9 LUMBAR DISC DISORDER: ICD-10-CM

## 2023-04-04 PROCEDURE — 99213 OFFICE O/P EST LOW 20 MIN: CPT | Performed by: PAIN MEDICINE

## 2023-04-04 PROCEDURE — 1124F ACP DISCUSS-NO DSCNMKR DOCD: CPT | Performed by: PAIN MEDICINE

## 2023-04-04 PROCEDURE — 99214 OFFICE O/P EST MOD 30 MIN: CPT | Performed by: PAIN MEDICINE

## 2023-04-04 RX ORDER — HYDROCODONE BITARTRATE AND ACETAMINOPHEN 5; 325 MG/1; MG/1
1 TABLET ORAL EVERY 8 HOURS PRN
COMMUNITY

## 2023-04-04 NOTE — PROGRESS NOTES
Bethany Arredondo presents to the [facility] on 4/4/2023. Kristofer is complaining of pain in back. The pain is constant. The pain is described as aching. Pain is rated on her best day at a 0, on her worst day at a 10, and on average at a 10 on the VAS scale. She took her last dose of Norco today. Aliza Edwards does not have issues with constipation. Any procedures since your last visit: No.    She is not any NSAIDS or anticoagulants. Pacemaker or defibrillator: No.    Medication Contract and Consent for Opioid Use Documents Filed        No documents found                       There were no vitals taken for this visit. No LMP recorded. Patient has had a hysterectomy.
Breast Cancer Maternal Aunt         postmenopausal     REVIEW OF SYSTEMS:     Kristofer denies fever/chills, chest pain, shortness of breath, new bowel or bladder complaints. All other review of systems was negative. PHYSICAL EXAMINATION:      /76   Pulse 82   Ht 5' 1\" (1.549 m)   Wt 145 lb (65.8 kg)   SpO2 96%   BMI 27.40 kg/m²     General:       General appearance:   elderly, pleasant, and well-hydrated. , in moderate discomfort and A & O x3  Build:Normal Weight     HEENT:     Head:normocephalic and atraumatic  Sclera: icterus absent,      Lungs:     Breathing:Breathing Pattern: regular, no distress     Abdomen:     Shape:non-distended and normal  Tenderness:none     Lumbar spine:     Spine inspection:normal   CVA tenderness:No   Palpation:tenderness paravertebral muscles, midline tenderness, facet loading, left, right, positive, and tenderness. Range of motion:abnormal moderately Lateral bending, flexion, extension rotation bilateral and is  painful. Musculoskeletal:     Trigger points in Paraveteral:absent bilaterally  SI joint tenderness:negative right, negative left              VONNIE test:not done right, not done             left  Piriformis tenderness:negative right, negative left  Trochanteric bursa tenderness:negative right, negative left  SLR:negative right, negative left, sitting      Extremities:     Tremors:None bilaterally upper and lower  Range of motion:pain with internal rotation of hips negative. Intact:Yes  Edema:Normal     Knee: Inspection:Bilateral knees replaced. Neurological:     Sensory:normal to light touch bilateral lower extremities.   Motor:                         Right Quadriceps5-/5          Left Quadriceps5-/5           Right Gastrocnemius5-/5    Left Gastrocnemius5-/5  Right Ant Tibialis5-/5  Left Ant Tibialis5-/5  Reflexes:    Right Quadriceps reflexNOT DONE  Left Quadriceps reflexNOT DONE  Right Achilles reflex2+  Left Achilles reflex2+  Gait:antalgic

## 2023-04-06 ENCOUNTER — TREATMENT (OUTPATIENT)
Dept: PHYSICAL THERAPY | Age: 78
End: 2023-04-06

## 2023-04-06 DIAGNOSIS — M17.12 PRIMARY OSTEOARTHRITIS OF LEFT KNEE: Primary | ICD-10-CM

## 2023-04-06 NOTE — PROGRESS NOTES
Marching 20x  TA   Squat  20x  TA   Side kicks  20x     Step-ups - FWD  6\"  30x  TA   Step-ups - LAT 6\"   30x   TA   Step-ups - BWD    TA   Step up and over reciprocally    TA   [] TG  [] Leg Press 2-leg next  TE   [] TG  [] Leg Press 1-leg   TE   CR    TE   Knee Extension Machine   TE   Marching gait   NR   Side stepping   NR                A: Tolerated well  . Progression in step up reps as listed . P: Continue with rehab plan x 3 more sessions. Isamar Craig  RTD 5/30/2023  Endy Najera PTA    Treatment Charges: Mins Units   Initial Evaluation     Re-Evaluation     Ther Exercise         TE 30 2   Manual Therapy     MT     Ther Activities        TA 10 1   Gait Training          GT     Neuro Re-education NR     Modalities     Non-Billable Service Time     Other     Total Time/Units 40 3

## 2023-04-19 ENCOUNTER — TREATMENT (OUTPATIENT)
Dept: PHYSICAL THERAPY | Age: 78
End: 2023-04-19

## 2023-04-19 DIAGNOSIS — M17.12 PRIMARY OSTEOARTHRITIS OF LEFT KNEE: Primary | ICD-10-CM

## 2023-04-26 ENCOUNTER — OFFICE VISIT (OUTPATIENT)
Dept: PAIN MANAGEMENT | Age: 78
End: 2023-04-26
Payer: MEDICARE

## 2023-04-26 VITALS
WEIGHT: 145 LBS | OXYGEN SATURATION: 93 % | BODY MASS INDEX: 27.38 KG/M2 | SYSTOLIC BLOOD PRESSURE: 132 MMHG | HEART RATE: 70 BPM | RESPIRATION RATE: 18 BRPM | DIASTOLIC BLOOD PRESSURE: 70 MMHG | TEMPERATURE: 96.8 F | HEIGHT: 61 IN

## 2023-04-26 DIAGNOSIS — S32.020A CLOSED COMPRESSION FRACTURE OF L2 LUMBAR VERTEBRA, INITIAL ENCOUNTER (HCC): ICD-10-CM

## 2023-04-26 DIAGNOSIS — M47.816 LUMBAR FACET ARTHROPATHY: ICD-10-CM

## 2023-04-26 DIAGNOSIS — G89.4 CHRONIC PAIN SYNDROME: Primary | ICD-10-CM

## 2023-04-26 DIAGNOSIS — M48.061 SPINAL STENOSIS OF LUMBAR REGION WITHOUT NEUROGENIC CLAUDICATION: ICD-10-CM

## 2023-04-26 DIAGNOSIS — M51.9 LUMBAR DISC DISORDER: ICD-10-CM

## 2023-04-26 DIAGNOSIS — M47.816 LUMBAR SPONDYLOSIS: ICD-10-CM

## 2023-04-26 PROCEDURE — 1124F ACP DISCUSS-NO DSCNMKR DOCD: CPT | Performed by: PAIN MEDICINE

## 2023-04-26 PROCEDURE — 99213 OFFICE O/P EST LOW 20 MIN: CPT | Performed by: PAIN MEDICINE

## 2023-04-26 RX ORDER — TIMOLOL MALEATE 5 MG/ML
SOLUTION/ DROPS OPHTHALMIC
COMMUNITY
Start: 2023-04-14

## 2023-04-26 NOTE — PROGRESS NOTES
Jeffrey Montanez presents to the [address] [address] on 4/26/2023. Chayito Atkins is complaining of pain in her lower back (pt states fracture is healing and pain is decreasing) The pain is intermittent. The pain is described as aching. Pain is rated on her best day at a 0, on her worst day at a 4, and on average at a 2 on the VAS scale. She took her last dose of Tylenol today. Chayito Atkins does not have issues with constipation. Any procedures since your last visit: No,       She is not on NSAIDS and  is not on anticoagulation medications to include none and is managed by NA. Pacemaker or defibrillator: No Physician managing device is NA. Medication Contract and Consent for Opioid Use Documents Filed        No documents found                       Temp 96.8 °F (36 °C) (Infrared)   Resp 18   Ht 5' 1\" (1.549 m)   Wt 145 lb (65.8 kg)   BMI 27.40 kg/m²      No LMP recorded. Patient has had a hysterectomy.

## 2023-04-26 NOTE — PROGRESS NOTES
[facility]  [address], [place] [address]  [phone]    Follow up Note      Kristofer Watson     Date of Visit:  4/26/2023    CC:  Patient presents for follow up   Chief Complaint   Patient presents with    Back Pain     HPI:    Pain is better  Appropriate analgesia with current medications regimen: yes - fair.    Change in quality of symptoms:no.    Medication side effects:none.   Recent diagnostic testing:none.   Recent interventional procedures:none.      She has been on anticoagulation medications to include ASA. The patient  has not been on herbal supplements.  The patient is not diabetic.     Imaging:  Lumbar spine MRI 2023:  1. Mild subacute compression fracture involving the superior endplate of L2.   No other acute fracture seen.   2. Advanced degenerative change with multilevel central canal stenosis, most   prominent (severe) at L3-4 and L4-5.   3. Multilevel neural foraminal stenosis, most prominent (severe) on the left   at L4-5.         Lumbar spine CT 2023  Age indeterminate compression fracture at the superior endplate of L2 with up   to 15% height loss, likely subacute.       Mild chronic compression deformity at the superior endplate of L4.       Degenerative disc disease as described above, exacerbating congenitally   narrow lumbar spinal canal.       Spinal canal narrowing, moderate to severe at L3-4, moderate at L4-5, mild at   L1-2 and L2-3.       Narrowing of the left lateral recess at L3-4, possibly contacting the left   descending L4 nerve root.       Narrowing of the left lateral recess at L4-5, possibly contacting the left   descending L5 nerve root.       Narrowing of the left lateral recess at L5-S1, possibly contacting the left   descending S1 nerve root.       Foraminal narrowing, severe at left L4-5, moderate at right L1-2, left L2-3   and left L3-4, mild to moderate at right L4-5 and right L5-S1.      Previous treatments: Physical

## 2023-05-30 ENCOUNTER — OFFICE VISIT (OUTPATIENT)
Dept: ORTHOPEDIC SURGERY | Age: 78
End: 2023-05-30

## 2023-05-30 VITALS — HEIGHT: 61 IN | TEMPERATURE: 98 F | BODY MASS INDEX: 27.38 KG/M2 | WEIGHT: 145 LBS

## 2023-05-30 DIAGNOSIS — Z96.652 S/P TOTAL KNEE ARTHROPLASTY, LEFT: Primary | ICD-10-CM

## 2023-05-30 NOTE — PROGRESS NOTES
deformity noted. Upon palpation there is not tenderness. ROM: is   full and symmetrical.   Strength: Hip Flexors 5/5; Hip Abductors 5/5; Hip Adduction 5/5. Knee exam:  Left knee exam shows;  range of motion of R. Knee is 0 to 110, and L. Knee is 5 to 110. The patient does not have  pain on motion, effusion is mild, there is tenderness over the  medial region, there are not any masses, there is not ligamentous instability, there is not  deformity noted. Knee exam: neither positive for moderate crepitations, some mild tenderness laxity is not noted with  stress. R. Knee:  Lachman's negative, Anterior Drawer negative, Posterior Drawer negative  Prateek's negative, Thallasy  negative,   PF grind test negative, Apprehension test negative, Patellar J sign  negative  L. Knee:  Lachman's negative, Anterior Drawer negative, Posterior Drawer negative  Prateek's negative, Thallasy  negative,   PF grind test negative, Apprehension test negative,  Patellar J sign  Negative    Xrays: left TKA well aligned with no signs of aseptic loosening    Radiographic findings reviewed with patient    Impression:   Encounter Diagnosis   Name Primary?     S/P total knee arthroplasty, left Yes       Plan:  Hep  Activity as tolerated  Abx discussed  Fu in 3 months with xr

## 2024-12-23 ENCOUNTER — HOSPITAL ENCOUNTER (OUTPATIENT)
Dept: GENERAL RADIOLOGY | Age: 79
Discharge: HOME | End: 2024-12-25
Payer: MEDICARE

## 2024-12-23 DIAGNOSIS — Z12.31 VISIT FOR SCREENING MAMMOGRAM: ICD-10-CM

## 2024-12-23 PROCEDURE — 77067 SCR MAMMO BI INCL CAD: CPT

## 2025-03-14 ENCOUNTER — OFFICE VISIT (OUTPATIENT)
Age: 80
End: 2025-03-14
Payer: MEDICARE

## 2025-03-14 VITALS
BODY MASS INDEX: 27.26 KG/M2 | DIASTOLIC BLOOD PRESSURE: 73 MMHG | SYSTOLIC BLOOD PRESSURE: 153 MMHG | HEIGHT: 61 IN | WEIGHT: 144.4 LBS | HEART RATE: 69 BPM

## 2025-03-14 DIAGNOSIS — R41.3 MEMORY LOSS: Primary | ICD-10-CM

## 2025-03-14 DIAGNOSIS — M48.061 SPINAL STENOSIS OF LUMBAR REGION WITHOUT NEUROGENIC CLAUDICATION: ICD-10-CM

## 2025-03-14 PROCEDURE — 1124F ACP DISCUSS-NO DSCNMKR DOCD: CPT | Performed by: PSYCHIATRY & NEUROLOGY

## 2025-03-14 PROCEDURE — 99204 OFFICE O/P NEW MOD 45 MIN: CPT | Performed by: PSYCHIATRY & NEUROLOGY

## 2025-03-14 PROCEDURE — 1159F MED LIST DOCD IN RCRD: CPT | Performed by: PSYCHIATRY & NEUROLOGY

## 2025-03-14 RX ORDER — DONEPEZIL HYDROCHLORIDE 10 MG/1
10 TABLET, FILM COATED ORAL NIGHTLY
Qty: 30 TABLET | Refills: 3
Start: 2025-04-14

## 2025-03-14 RX ORDER — DONEPEZIL HYDROCHLORIDE 5 MG/1
5 TABLET, FILM COATED ORAL NIGHTLY
Qty: 30 TABLET | Refills: 0 | Status: SHIPPED | OUTPATIENT
Start: 2025-03-14 | End: 2025-04-13

## 2025-03-14 NOTE — PATIENT INSTRUCTIONS
Mri brain      Blood w/u ordered, but will get a copy of blood w/uy done at pcp's office. Compare and let her know what are the blood w/u to be done  Call in a week      Start aricept 5 mg at night for a month then  10 mg at night      Nicole Bee MD

## 2025-03-14 NOTE — PROGRESS NOTES
Nicole Bee MD       Kristofer Watson is a 80 y.o. female presenting as a new patient for a   Chief Complaint   Patient presents with    New Patient    Memory Loss      Memory loss:  Onset:  gives history  At least a year      Examples: could not remember how to make sloppy faith's.   Intermittent confusion  Cannot remember what tasks she was planning tyo do     Progressively worse  Fluctuates a lot    Lives with    Long term memory : ok   Short term memory loss: affected   Conversation: not  struggles for words. Repeats a lot. Cannot remember conversation.     ADL- normal. Takes care of grandkids   Driving: keeps license.   Does not drive for 2 years.     Finances:  takes care of it   Medications: has a pill organizer,  organizes it   Takes her medications on her own  Sleep: ok, uses cpap for > 5 years   Mood: ok ,   Hallucinations: no  Cooking: patient takes care of it. Forgot once how to make sloppy faith's   No accidents in kitchen. No trouble with recipes    Grocery shopping: both  and patient makes the list. Shops together.     Balance: ok  Falls: once in last 6 months.   Tremors: none  Bladder control: ok         Ros:   C/o low back pain  No sciatica    Has francesca knee pain    No numbness in leg  Bladder control: ok    Mri lumbar spine:   2023:   IMPRESSION:  1. Mild subacute compression fracture involving the superior endplate of L2.  No other acute fracture seen.  2. Advanced degenerative change with multilevel central canal stenosis, most  prominent (severe) at L3-4 and L4-5.  3. Multilevel neural foraminal stenosis, most prominent (severe) on the left  at L4-5.      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

## 2025-04-15 ENCOUNTER — HOSPITAL ENCOUNTER (OUTPATIENT)
Dept: MRI IMAGING | Age: 80
Discharge: HOME | End: 2025-04-17
Attending: PSYCHIATRY & NEUROLOGY
Payer: MEDICARE

## 2025-04-15 DIAGNOSIS — R41.3 MEMORY LOSS: ICD-10-CM

## 2025-04-15 PROCEDURE — 70551 MRI BRAIN STEM W/O DYE: CPT

## 2025-04-25 ENCOUNTER — RESULTS FOLLOW-UP (OUTPATIENT)
Age: 80
End: 2025-04-25

## 2025-04-30 ENCOUNTER — TELEPHONE (OUTPATIENT)
Dept: PHARMACY | Facility: CLINIC | Age: 80
End: 2025-04-30

## 2025-04-30 NOTE — TELEPHONE ENCOUNTER
[facility] CLINICAL PHARMACY: ADHERENCE REVIEW  Identified care gap per [place]: fills at [facility]: Statin adherence    Patient also appears to be prescribed: ACE/ARB    ASSESSMENT  STATIN ADHERENCE    Insurance Records claims through 25 (Prior Year PDC = not reported; YTD PDC = FIRST FILL; Potential Fail Date: 25):   SIMVASTATIN  TAB 20MG last filled on 25 for 90 day supply. Next refill due: 25    Prescribed si tablet/capsule daily in the evening    Per Insurer Portal: last filled on 25 for 90 day supply.     Per [facility]: not contacted  1RR    No results found for: \"CHOL\", \"TRIG\", \"HDL\", \"LDLDIRECT\"  No results found for: \"LDL\", \"LDLDIRECT\"   ALT   Date Value Ref Range Status   2023 13 0 - 32 U/L Final     AST   Date Value Ref Range Status   2023 19 0 - 31 U/L Final     The ASCVD Risk score (Jose Antonio DK, et al., 2019) failed to calculate for the following reasons:    The 2019 ASCVD risk score is only valid for ages 40 to 79     PLAN    The following are interventions that have been identified:   Patient OVERDUE refilling SIMVASTATIN  TAB 20MG and active on home medication list.   Patient will need FUTURE REFILLS for SIMVASTATIN  TAB 20MG     Attempting to reach patient to review.  Left message asking for return call. MyChart message sent to patient.    Last Visit: none  Next Visit: none    Gabrielle Steward [facility]   [facility]  [phone] Option 1     For Pharmacy Admin Tracking Only    Program: [facility]  CPA in place:  No  Gap Closed?: No   Time Spent (min): 5

## 2025-07-31 ENCOUNTER — OFFICE VISIT (OUTPATIENT)
Age: 80
End: 2025-07-31
Payer: MEDICARE

## 2025-07-31 VITALS
DIASTOLIC BLOOD PRESSURE: 94 MMHG | SYSTOLIC BLOOD PRESSURE: 154 MMHG | HEIGHT: 61 IN | HEART RATE: 81 BPM | WEIGHT: 146.6 LBS | BODY MASS INDEX: 27.68 KG/M2

## 2025-07-31 DIAGNOSIS — R59.1 LYMPHADENOPATHY OF HEAD AND NECK: ICD-10-CM

## 2025-07-31 DIAGNOSIS — F01.B0 MODERATE MIXED VASCULAR AND NEURODEGENERATIVE DEMENTIA WITHOUT BEHAVIORAL DISTURBANCE, PSYCHOTIC DISTURBANCE, MOOD DISTURBANCE, OR ANXIETY (HCC): ICD-10-CM

## 2025-07-31 DIAGNOSIS — R41.3 MEMORY LOSS: Primary | ICD-10-CM

## 2025-07-31 PROCEDURE — 1159F MED LIST DOCD IN RCRD: CPT | Performed by: PSYCHIATRY & NEUROLOGY

## 2025-07-31 PROCEDURE — 99214 OFFICE O/P EST MOD 30 MIN: CPT | Performed by: PSYCHIATRY & NEUROLOGY

## 2025-07-31 PROCEDURE — 1124F ACP DISCUSS-NO DSCNMKR DOCD: CPT | Performed by: PSYCHIATRY & NEUROLOGY

## 2025-07-31 RX ORDER — ASPIRIN 81 MG/1
81 TABLET ORAL DAILY
Qty: 90 TABLET | Refills: 1 | Status: SHIPPED | OUTPATIENT
Start: 2025-07-31

## 2025-07-31 RX ORDER — DONEPEZIL HYDROCHLORIDE 10 MG/1
10 TABLET, FILM COATED ORAL NIGHTLY
Qty: 30 TABLET | Refills: 5
Start: 2025-07-31

## 2025-07-31 NOTE — PROGRESS NOTES
Nicole Bee MD       Kristofer Watson is a 80 y.o. female presenting as a follow patient for a   Chief Complaint   Patient presents with    Follow-up    Dementia      Memory loss:  Onset:  gives history  At least a year        Examples: could not remember how to make sloppy faith's.   Intermittent confusion  Cannot remember what tasks she was planning tyo do      Progressively worse  Fluctuates a lot      Took aricept 5 mg qhs only  Ran out  On asa       W/u :  Mri brain:   IMPRESSION:  1. No acute intracranial abnormality.  2. Moderate distribution supratentorial, brainstem, and cerebellar chronic  small-vessel ischemic changes.  3. Increased distribution of upper cervical lymph nodes.  Age indeterminate  and nonspecific.  This may represent reactive hyperplasia from recent  infection/inflammation, or lymphoproliferative disorder.  Recommend  correlation with history and exam.          Lives with    Long term memory : ok   Short term memory loss: affected   Conversation: not  struggles for words. Repeats a lot. Cannot remember conversation.      ADL- normal. Takes care of grandkids   Driving: keeps license.   Does not drive for 2 years.      Finances:  takes care of it   Medications: has a pill organizer,  organizes it   Takes her medications on her own  Sleep: ok, uses cpap for > 5 years   Mood: ok ,   Hallucinations: no  Cooking: patient takes care of it. Forgot once how to make sloppy faith's   No accidents in kitchen. No trouble with recipes     Grocery shopping: both  and patient makes the list. Shops together.      Balance: ok  Falls: once in last 6 months.   Tremors: none  Bladder control: ok            Ros:   C/o low back pain  No sciatica     Has francesca knee pain     No numbness in leg  Bladder control: ok     Mri lumbar spine:   2023:   IMPRESSION:  1. Mild subacute compression fracture involving the superior endplate of L2.  No other acute fracture seen.  2. Advanced

## 2025-08-05 ENCOUNTER — TELEPHONE (OUTPATIENT)
Dept: ENT CLINIC | Age: 80
End: 2025-08-05

## 2025-08-26 ENCOUNTER — HOSPITAL ENCOUNTER (OUTPATIENT)
Dept: ULTRASOUND IMAGING | Age: 80
Discharge: HOME | End: 2025-08-26
Attending: PSYCHIATRY & NEUROLOGY
Payer: MEDICARE

## 2025-08-26 DIAGNOSIS — R59.1 LYMPHADENOPATHY OF HEAD AND NECK: ICD-10-CM

## 2025-08-26 PROCEDURE — 76536 US EXAM OF HEAD AND NECK: CPT

## (undated) DEVICE — GLOVE ORANGE PI 8   MSG9080

## (undated) DEVICE — GARMENT,MEDLINE,DVT,INT,CALF,MED, GEN2: Brand: MEDLINE

## (undated) DEVICE — HANDPIECE SET WITH BONE CLEANING TIP: Brand: INTERPULSE

## (undated) DEVICE — 3 BONE CEMENT MIXER: Brand: MIXEVAC

## (undated) DEVICE — SOLUTION IRRIG 1000ML 0.9% SOD CHL USP POUR PLAS BTL

## (undated) DEVICE — PEEL-AWAY HOOD: Brand: FLYTE, SURGICOOL

## (undated) DEVICE — STANDARD HYPODERMIC NEEDLE,POLYPROPYLENE HUB: Brand: MONOJECT

## (undated) DEVICE — PITCHER PT 1200ML W HNDL CSR WRP

## (undated) DEVICE — NEEDLE SPNL L3.5IN PNK HUB S STL REG WALL FIT STYL W/ QNCKE

## (undated) DEVICE — 3M™ IOBAN™ 2 ANTIMICROBIAL INCISE DRAPE 6650EZ: Brand: IOBAN™ 2

## (undated) DEVICE — SYRINGE MED 50ML LUERLOCK TIP

## (undated) DEVICE — TUBING, SUCTION, 1/4" X 10', STRAIGHT: Brand: MEDLINE

## (undated) DEVICE — SOLUTION IRRIG 500ML 0.9% SOD CHLO USP POUR PLAS BTL

## (undated) DEVICE — APPLICATOR MEDICATED 26 CC SOLUTION HI LT ORNG CHLORAPREP

## (undated) DEVICE — SHEET DRAPE FULL 70X100

## (undated) DEVICE — CANNULA IV 18GA L15IN BLNT FILL LUERLOCK HUB MJCT

## (undated) DEVICE — DOUBLE BASIN SET: Brand: MEDLINE INDUSTRIES, INC.

## (undated) DEVICE — CLOTH SURG PREP PREOPERATIVE CHLORHEXIDINE GLUC 2% READYPREP

## (undated) DEVICE — SUTURE SUTTAPE L40IN DIA1.3MM NONABSORBABLE WHT BLU L26.5MM AR7500

## (undated) DEVICE — INTENDED FOR TISSUE SEPARATION, AND OTHER PROCEDURES THAT REQUIRE A SHARP SURGICAL BLADE TO PUNCTURE OR CUT.: Brand: BARD-PARKER ® STAINLESS STEEL BLADES

## (undated) DEVICE — PACK,EXTREMITY,ORTHOMAX,5/CS: Brand: MEDLINE

## (undated) DEVICE — COVER,TABLE,60X90,STERILE: Brand: MEDLINE

## (undated) DEVICE — SOLUTION IV 1000ML 0.9% SOD CHL PH 5 INJ USP VIAFLX PLAS

## (undated) DEVICE — SYRINGE IRRIG 60ML SFT PLIABLE BLB EZ TO GRP 1 HND USE W/

## (undated) DEVICE — ELECTRODE PT RET AD L9FT HI MOIST COND ADH HYDRGEL CORDED

## (undated) DEVICE — NDL CNTR 40CT FM MAG: Brand: MEDLINE INDUSTRIES, INC.

## (undated) DEVICE — COVER,LIGHT HANDLE,FLX,1/PK: Brand: MEDLINE INDUSTRIES, INC.

## (undated) DEVICE — MARKER,SKIN,WI/RULER AND LABELS: Brand: MEDLINE

## (undated) DEVICE — TOWEL,OR,DSP,ST,BLUE,STD,6/PK,12PK/CS: Brand: MEDLINE

## (undated) DEVICE — NEPTUNE E-SEP SMOKE EVACUATION PENCIL, COATED, 70MM BLADE, PUSH BUTTON SWITCH: Brand: NEPTUNE E-SEP

## (undated) DEVICE — BANDAGE COMPR L W4INXL11YD 100% COT WVN E DBL LEN CLP CLSR

## (undated) DEVICE — 3M™ IOBAN™ 2 ANTIMICROBIAL INCISE DRAPE 6640EZ: Brand: IOBAN™ 2

## (undated) DEVICE — DRESSING HYDROFIBER AQUACEL AG ADVANTAGE 3.5X12 IN

## (undated) DEVICE — GOWN,SIRUS,POLYRNF,RAGLAN,XL,ST,30/CS: Brand: MEDLINE

## (undated) DEVICE — 3M™ STERI-DRAPE™ U-DRAPE 1015: Brand: STERI-DRAPE™

## (undated) DEVICE — BANDAGE COMPR W6INXL12FT SMOOTH FOR LIMB EXSANG ESMARCH

## (undated) DEVICE — NEEDLE FLTR 18GA L1.5IN MEM THK5UM BLNT DISP

## (undated) DEVICE — SURG GL,SENSICARE PI ORTHO LT,LF,PF,8.0: Brand: MEDLINE

## (undated) DEVICE — STAPLER SKIN H3.9MM WIRE DIA0.58MM CRWN 6.9MM 35 STPL ROT

## (undated) DEVICE — SPONGE LAP W18XL18IN WHT COT 4 PLY FLD STRUNG RADPQ DISP ST

## (undated) DEVICE — BANDAGE COMPR W6INXL5YD SELF ADH COHESIVE CO FLX

## (undated) DEVICE — SOLUTION SCRB 32OZ 7.5% POVIDONE IOD BTL GENTLE EFFECTIVE

## (undated) DEVICE — YANKAUER,OPEN TIP,W/O VENT,STERILE: Brand: MEDLINE INDUSTRIES, INC.